# Patient Record
Sex: MALE | Race: WHITE | NOT HISPANIC OR LATINO | Employment: OTHER | URBAN - METROPOLITAN AREA
[De-identification: names, ages, dates, MRNs, and addresses within clinical notes are randomized per-mention and may not be internally consistent; named-entity substitution may affect disease eponyms.]

---

## 2023-09-01 DIAGNOSIS — Z00.00 MEDICARE ANNUAL WELLNESS VISIT, SUBSEQUENT: ICD-10-CM

## 2023-09-01 DIAGNOSIS — R79.9 ABNORMAL BLOOD CHEMISTRY: ICD-10-CM

## 2023-09-01 DIAGNOSIS — E55.9 VITAMIN D DEFICIENCY: ICD-10-CM

## 2023-09-05 ENCOUNTER — LAB (OUTPATIENT)
Dept: LAB | Facility: LAB | Age: 83
End: 2023-09-05
Payer: MEDICARE

## 2023-09-05 DIAGNOSIS — Z00.00 MEDICARE ANNUAL WELLNESS VISIT, SUBSEQUENT: ICD-10-CM

## 2023-09-05 DIAGNOSIS — R79.9 ABNORMAL BLOOD CHEMISTRY: ICD-10-CM

## 2023-09-05 DIAGNOSIS — E55.9 VITAMIN D DEFICIENCY: ICD-10-CM

## 2023-09-05 LAB
ALANINE AMINOTRANSFERASE (SGPT) (U/L) IN SER/PLAS: 15 U/L (ref 10–52)
ANION GAP IN SER/PLAS: 14 MMOL/L (ref 10–20)
APPEARANCE, URINE: CLEAR
BASOPHILS (10*3/UL) IN BLOOD BY AUTOMATED COUNT: 0.04 X10E9/L (ref 0–0.1)
BASOPHILS/100 LEUKOCYTES IN BLOOD BY AUTOMATED COUNT: 1 % (ref 0–2)
BILIRUBIN, URINE: NEGATIVE
BLOOD, URINE: ABNORMAL
CALCIDIOL (25 OH VITAMIN D3) (NG/ML) IN SER/PLAS: 38 NG/ML
CALCIUM (MG/DL) IN SER/PLAS: 9.4 MG/DL (ref 8.6–10.6)
CARBON DIOXIDE, TOTAL (MMOL/L) IN SER/PLAS: 26 MMOL/L (ref 21–32)
CHLORIDE (MMOL/L) IN SER/PLAS: 105 MMOL/L (ref 98–107)
CHOLESTEROL (MG/DL) IN SER/PLAS: 179 MG/DL (ref 0–199)
CHOLESTEROL IN HDL (MG/DL) IN SER/PLAS: 51.1 MG/DL
CHOLESTEROL/HDL RATIO: 3.5
COBALAMIN (VITAMIN B12) (PG/ML) IN SER/PLAS: 333 PG/ML (ref 211–911)
COLOR, URINE: YELLOW
CREATININE (MG/DL) IN SER/PLAS: 1.36 MG/DL (ref 0.5–1.3)
EOSINOPHILS (10*3/UL) IN BLOOD BY AUTOMATED COUNT: 0.19 X10E9/L (ref 0–0.4)
EOSINOPHILS/100 LEUKOCYTES IN BLOOD BY AUTOMATED COUNT: 4.6 % (ref 0–6)
ERYTHROCYTE DISTRIBUTION WIDTH (RATIO) BY AUTOMATED COUNT: 13.2 % (ref 11.5–14.5)
ERYTHROCYTE MEAN CORPUSCULAR HEMOGLOBIN CONCENTRATION (G/DL) BY AUTOMATED: 32.9 G/DL (ref 32–36)
ERYTHROCYTE MEAN CORPUSCULAR VOLUME (FL) BY AUTOMATED COUNT: 97 FL (ref 80–100)
ERYTHROCYTES (10*6/UL) IN BLOOD BY AUTOMATED COUNT: 5.12 X10E12/L (ref 4.5–5.9)
GFR MALE: 51 ML/MIN/1.73M2
GLUCOSE (MG/DL) IN SER/PLAS: 94 MG/DL (ref 74–99)
GLUCOSE, URINE: NEGATIVE MG/DL
HEMATOCRIT (%) IN BLOOD BY AUTOMATED COUNT: 49.5 % (ref 41–52)
HEMOGLOBIN (G/DL) IN BLOOD: 16.3 G/DL (ref 13.5–17.5)
IMMATURE GRANULOCYTES/100 LEUKOCYTES IN BLOOD BY AUTOMATED COUNT: 0.5 % (ref 0–0.9)
KETONES, URINE: NEGATIVE MG/DL
LDL: 108 MG/DL (ref 0–99)
LEUKOCYTE ESTERASE, URINE: NEGATIVE
LEUKOCYTES (10*3/UL) IN BLOOD BY AUTOMATED COUNT: 4.2 X10E9/L (ref 4.4–11.3)
LYMPHOCYTES (10*3/UL) IN BLOOD BY AUTOMATED COUNT: 0.8 X10E9/L (ref 0.8–3)
LYMPHOCYTES/100 LEUKOCYTES IN BLOOD BY AUTOMATED COUNT: 19.2 % (ref 13–44)
MONOCYTES (10*3/UL) IN BLOOD BY AUTOMATED COUNT: 0.55 X10E9/L (ref 0.05–0.8)
MONOCYTES/100 LEUKOCYTES IN BLOOD BY AUTOMATED COUNT: 13.2 % (ref 2–10)
NEUTROPHILS (10*3/UL) IN BLOOD BY AUTOMATED COUNT: 2.57 X10E9/L (ref 1.6–5.5)
NEUTROPHILS/100 LEUKOCYTES IN BLOOD BY AUTOMATED COUNT: 61.5 % (ref 40–80)
NITRITE, URINE: NEGATIVE
NRBC (PER 100 WBCS) BY AUTOMATED COUNT: 0 /100 WBC (ref 0–0)
PH, URINE: 7 (ref 5–8)
PLATELETS (10*3/UL) IN BLOOD AUTOMATED COUNT: 220 X10E9/L (ref 150–450)
POTASSIUM (MMOL/L) IN SER/PLAS: 4.5 MMOL/L (ref 3.5–5.3)
PROSTATE SPECIFIC AG (NG/ML) IN SER/PLAS: 0.78 NG/ML (ref 0–4)
PROTEIN, URINE: NEGATIVE MG/DL
RBC, URINE: 1 /HPF (ref 0–5)
SODIUM (MMOL/L) IN SER/PLAS: 140 MMOL/L (ref 136–145)
SPECIFIC GRAVITY, URINE: 1.01 (ref 1–1.03)
THYROTROPIN (MIU/L) IN SER/PLAS BY DETECTION LIMIT <= 0.05 MIU/L: 1.97 MIU/L (ref 0.44–3.98)
TRIGLYCERIDE (MG/DL) IN SER/PLAS: 98 MG/DL (ref 0–149)
UREA NITROGEN (MG/DL) IN SER/PLAS: 22 MG/DL (ref 6–23)
UROBILINOGEN, URINE: <2 MG/DL (ref 0–1.9)
VLDL: 20 MG/DL (ref 0–40)
WBC, URINE: NORMAL /HPF (ref 0–5)

## 2023-09-05 PROCEDURE — 84460 ALANINE AMINO (ALT) (SGPT): CPT

## 2023-09-05 PROCEDURE — 82306 VITAMIN D 25 HYDROXY: CPT

## 2023-09-05 PROCEDURE — 80048 BASIC METABOLIC PNL TOTAL CA: CPT

## 2023-09-05 PROCEDURE — 85025 COMPLETE CBC W/AUTO DIFF WBC: CPT

## 2023-09-05 PROCEDURE — 82607 VITAMIN B-12: CPT

## 2023-09-05 PROCEDURE — 84153 ASSAY OF PSA TOTAL: CPT

## 2023-09-05 PROCEDURE — 84443 ASSAY THYROID STIM HORMONE: CPT

## 2023-09-05 PROCEDURE — 81001 URINALYSIS AUTO W/SCOPE: CPT

## 2023-09-05 PROCEDURE — 80061 LIPID PANEL: CPT

## 2023-09-05 PROCEDURE — 36415 COLL VENOUS BLD VENIPUNCTURE: CPT

## 2023-09-14 RX ORDER — HYDROCHLOROTHIAZIDE 12.5 MG/1
12.5 CAPSULE ORAL
COMMUNITY

## 2023-09-14 RX ORDER — METOPROLOL SUCCINATE 50 MG/1
TABLET, EXTENDED RELEASE ORAL
COMMUNITY
Start: 2023-05-31

## 2023-09-14 RX ORDER — HYDROCHLOROTHIAZIDE 25 MG/1
TABLET ORAL
COMMUNITY

## 2023-09-15 ENCOUNTER — OFFICE VISIT (OUTPATIENT)
Dept: PRIMARY CARE | Facility: CLINIC | Age: 83
End: 2023-09-15
Payer: MEDICARE

## 2023-09-15 VITALS
HEART RATE: 71 BPM | TEMPERATURE: 97.5 F | RESPIRATION RATE: 18 BRPM | HEIGHT: 72 IN | SYSTOLIC BLOOD PRESSURE: 120 MMHG | WEIGHT: 232 LBS | DIASTOLIC BLOOD PRESSURE: 80 MMHG | BODY MASS INDEX: 31.42 KG/M2 | OXYGEN SATURATION: 96 %

## 2023-09-15 DIAGNOSIS — Z00.00 WELL ADULT EXAM: Primary | ICD-10-CM

## 2023-09-15 PROCEDURE — G0439 PPPS, SUBSEQ VISIT: HCPCS | Performed by: INTERNAL MEDICINE

## 2023-09-15 PROCEDURE — 1170F FXNL STATUS ASSESSED: CPT | Performed by: INTERNAL MEDICINE

## 2023-09-15 PROCEDURE — 1036F TOBACCO NON-USER: CPT | Performed by: INTERNAL MEDICINE

## 2023-09-15 PROCEDURE — 1159F MED LIST DOCD IN RCRD: CPT | Performed by: INTERNAL MEDICINE

## 2023-09-15 ASSESSMENT — PATIENT HEALTH QUESTIONNAIRE - PHQ9
1. LITTLE INTEREST OR PLEASURE IN DOING THINGS: NOT AT ALL
2. FEELING DOWN, DEPRESSED OR HOPELESS: NOT AT ALL
SUM OF ALL RESPONSES TO PHQ9 QUESTIONS 1 AND 2: 0

## 2023-09-15 ASSESSMENT — ACTIVITIES OF DAILY LIVING (ADL)
DRESSING: INDEPENDENT
BATHING: INDEPENDENT
GROCERY_SHOPPING: INDEPENDENT
DOING_HOUSEWORK: INDEPENDENT
TAKING_MEDICATION: INDEPENDENT
MANAGING_FINANCES: INDEPENDENT

## 2023-09-15 ASSESSMENT — ENCOUNTER SYMPTOMS
LOSS OF SENSATION IN FEET: 0
OCCASIONAL FEELINGS OF UNSTEADINESS: 0
DEPRESSION: 0

## 2023-09-15 NOTE — PROGRESS NOTES
Subjective   Patient ID: Jorge Esparza is a 83 y.o. male who presents for Medicare Annual Wellness Visit Subsequent.  Patient comes in for a physical examination, doing well over - all with no particular complaints.   Also in for laboratory review and health maintenance update.  Updating family history as well.          Review of Systems   All other systems reviewed and are negative.      Objective   Physical Exam  Constitutional:       Appearance: Normal appearance.   HENT:      Head: Normocephalic and atraumatic.      Nose: Nose normal.   Cardiovascular:      Rate and Rhythm: Normal rate and regular rhythm.   Abdominal:      General: Abdomen is flat.      Palpations: Abdomen is soft.   Musculoskeletal:         General: Normal range of motion.      Cervical back: Normal range of motion.   Skin:     General: Skin is warm and dry.   Neurological:      Mental Status: He is alert.       /80   Pulse 71   Temp 36.4 °C (97.5 °F)   Resp 18   Ht 1.829 m (6')   Wt 105 kg (232 lb)   SpO2 96%   BMI 31.46 kg/m²         Assessment/Plan   Problem List Items Addressed This Visit    None    ASSESSMENT AND PLAN: Patient on examination is in good health, will do screening blood tests to screen for high cholesterol, diabetes, thyroid. Patient should be taking enough calcium in a balanced diet or supplements to total 1200 mg a day in divided doses unless with history of specific types of kidney stones. Vitamin D 800-1000 iu a day, check levels if not taking any supplements. For Male Patients Only: Prostate cancer screening PSA. Preventive Medicine: colon cancer screening by age 50 if no family history, balanced diet, and exercise as discussed. Seat belt use for injury prevention, living will. Substance use and /or tobacco use counseled when applicable. Alcohol use discussed, use designated . Immunizations TD age 50 and every 10 years. Pneumovax and shingles vaccine counseled. Yearly flu vaccine unless  contraindicated. More than 50% of office visit time spent counseling the patient, questions were answered. If problems arise, patient is to call or come back in. It is understood that the responsibility of healthcare is shared by the patient by following a healthy lifestyle and following the plan above as discussed. Complete physical examination in a year.Patient  knows to call for lab results in two weeks if he does not receive letter or call from our office.

## 2024-02-20 ENCOUNTER — OFFICE VISIT (OUTPATIENT)
Dept: PRIMARY CARE | Facility: CLINIC | Age: 84
End: 2024-02-20
Payer: MEDICARE

## 2024-02-20 VITALS
HEIGHT: 72 IN | RESPIRATION RATE: 18 BRPM | WEIGHT: 231 LBS | BODY MASS INDEX: 31.29 KG/M2 | SYSTOLIC BLOOD PRESSURE: 144 MMHG | DIASTOLIC BLOOD PRESSURE: 100 MMHG | HEART RATE: 85 BPM | TEMPERATURE: 97.8 F | OXYGEN SATURATION: 97 %

## 2024-02-20 DIAGNOSIS — R05.1 ACUTE COUGH: Primary | ICD-10-CM

## 2024-02-20 PROCEDURE — 1036F TOBACCO NON-USER: CPT | Performed by: INTERNAL MEDICINE

## 2024-02-20 PROCEDURE — 99214 OFFICE O/P EST MOD 30 MIN: CPT | Performed by: INTERNAL MEDICINE

## 2024-02-20 RX ORDER — BUDESONIDE AND FORMOTEROL FUMARATE DIHYDRATE 80; 4.5 UG/1; UG/1
2 AEROSOL RESPIRATORY (INHALATION)
Qty: 10.2 G | Refills: 5 | Status: SHIPPED | OUTPATIENT
Start: 2024-02-20 | End: 2025-02-19

## 2024-02-20 RX ORDER — METHYLPREDNISOLONE 4 MG/1
TABLET ORAL
Qty: 21 TABLET | Refills: 0 | Status: SHIPPED | OUTPATIENT
Start: 2024-02-20 | End: 2024-02-27

## 2024-02-20 NOTE — PROGRESS NOTES
Subjective   Patient ID: Jorge Esparza is a 83 y.o. male who presents for GI Problem.  GI Problem        Review of Systems   All other systems reviewed and are negative.      Objective   Physical Exam  Constitutional:       Appearance: Normal appearance.   HENT:      Head: Normocephalic and atraumatic.      Nose: Nose normal.   Cardiovascular:      Rate and Rhythm: Normal rate and regular rhythm.   Abdominal:      General: Abdomen is flat.      Palpations: Abdomen is soft.   Musculoskeletal:         General: Normal range of motion.      Cervical back: Normal range of motion.   Skin:     General: Skin is warm and dry.   Neurological:      Mental Status: He is alert.       BP (!) 144/100   Pulse 85   Temp 36.6 °C (97.8 °F)   Resp 18   Ht 1.829 m (6')   Wt 105 kg (231 lb)   SpO2 97%   BMI 31.33 kg/m²         Assessment/Plan   Problem List Items Addressed This Visit       Acute cough - Primary    Relevant Medications    budesonide-formoteroL (Symbicort) 80-4.5 mcg/actuation inhaler    methylPREDNISolone (Medrol Dospak) 4 mg tablets

## 2024-04-15 ENCOUNTER — TELEPHONE (OUTPATIENT)
Dept: PRIMARY CARE | Facility: CLINIC | Age: 84
End: 2024-04-15
Payer: MEDICARE

## 2024-04-15 DIAGNOSIS — J45.909 MILD ASTHMA, UNSPECIFIED WHETHER COMPLICATED, UNSPECIFIED WHETHER PERSISTENT (HHS-HCC): Primary | ICD-10-CM

## 2024-04-15 RX ORDER — METHYLPREDNISOLONE 4 MG/1
8 TABLET ORAL ONCE
Status: CANCELLED | OUTPATIENT
Start: 2024-04-16 | End: 2024-04-16

## 2024-04-15 NOTE — TELEPHONE ENCOUNTER
Pt completed Rx from last month and it worked beautifully.  Slowly since then, Sx coming back.  Now when Pt is horizontal, he wheezes.  When vertical, no issues at all.       Pt req refill of the previous Rx to  [facility] on [phone] pt cell phone

## 2024-04-29 ENCOUNTER — OFFICE VISIT (OUTPATIENT)
Dept: PRIMARY CARE | Facility: CLINIC | Age: 84
End: 2024-04-29
Payer: MEDICARE

## 2024-04-29 VITALS
BODY MASS INDEX: 29.35 KG/M2 | DIASTOLIC BLOOD PRESSURE: 78 MMHG | WEIGHT: 228.7 LBS | RESPIRATION RATE: 15 BRPM | HEIGHT: 74 IN | SYSTOLIC BLOOD PRESSURE: 122 MMHG | TEMPERATURE: 98.4 F | HEART RATE: 56 BPM | OXYGEN SATURATION: 94 %

## 2024-04-29 DIAGNOSIS — R05.3 CHRONIC COUGH: Primary | ICD-10-CM

## 2024-04-29 PROCEDURE — 1125F AMNT PAIN NOTED PAIN PRSNT: CPT | Performed by: INTERNAL MEDICINE

## 2024-04-29 PROCEDURE — 1036F TOBACCO NON-USER: CPT | Performed by: INTERNAL MEDICINE

## 2024-04-29 PROCEDURE — 99213 OFFICE O/P EST LOW 20 MIN: CPT | Performed by: INTERNAL MEDICINE

## 2024-04-29 PROCEDURE — 1159F MED LIST DOCD IN RCRD: CPT | Performed by: INTERNAL MEDICINE

## 2024-04-29 RX ORDER — METHYLPREDNISOLONE 4 MG/1
TABLET ORAL
COMMUNITY
Start: 2024-04-16

## 2024-04-29 RX ORDER — METHYLPREDNISOLONE 4 MG/1
TABLET ORAL
Qty: 21 TABLET | Refills: 0 | Status: SHIPPED | OUTPATIENT
Start: 2024-04-29 | End: 2024-05-06

## 2024-04-29 RX ORDER — BUDESONIDE AND FORMOTEROL FUMARATE DIHYDRATE 80; 4.5 UG/1; UG/1
2 AEROSOL RESPIRATORY (INHALATION)
Qty: 10.2 G | Refills: 5 | Status: SHIPPED | OUTPATIENT
Start: 2024-04-29 | End: 2025-04-29

## 2024-04-29 ASSESSMENT — PAIN SCALES - GENERAL: PAINLEVEL: 2

## 2024-04-29 ASSESSMENT — ENCOUNTER SYMPTOMS: COUGH: 1

## 2024-04-29 NOTE — PROGRESS NOTES
Patient is here to discuss a persistent cough , and congestion that has lingered for about 2 months   No Pain  No Rx RF's Needed

## 2024-04-29 NOTE — PROGRESS NOTES
"Subjective   Patient ID: Jorge Esparza is a 84 y.o. male who presents for Cough.  Cough    In for on and off cought,     Review of Systems   Respiratory:  Positive for cough.        Objective   Physical Exam  /78   Pulse 56   Temp 36.9 °C (98.4 °F)   Resp 15   Ht 1.867 m (6' 1.5\")   Wt 104 kg (228 lb 11.2 oz)   SpO2 94%   BMI 29.76 kg/m²         Assessment/Plan   Problem List Items Addressed This Visit       Chronic cough - Primary    Relevant Medications    methylPREDNISolone (Medrol Dospak) 4 mg tablets    budesonide-formoteroL (Symbicort) 80-4.5 mcg/actuation inhaler    Other Relevant Orders    XR chest 2 views          "

## 2024-04-30 ENCOUNTER — HOSPITAL ENCOUNTER (OUTPATIENT)
Dept: RADIOLOGY | Facility: CLINIC | Age: 84
Discharge: HOME | End: 2024-04-30
Payer: MEDICARE

## 2024-04-30 DIAGNOSIS — R05.3 CHRONIC COUGH: ICD-10-CM

## 2024-04-30 PROCEDURE — 71046 X-RAY EXAM CHEST 2 VIEWS: CPT | Performed by: RADIOLOGY

## 2024-04-30 PROCEDURE — 71046 X-RAY EXAM CHEST 2 VIEWS: CPT

## 2024-06-14 ENCOUNTER — APPOINTMENT (OUTPATIENT)
Dept: PRIMARY CARE | Facility: CLINIC | Age: 84
End: 2024-06-14
Payer: MEDICARE

## 2024-06-14 VITALS
WEIGHT: 228.4 LBS | HEIGHT: 72 IN | TEMPERATURE: 98 F | OXYGEN SATURATION: 96 % | SYSTOLIC BLOOD PRESSURE: 126 MMHG | DIASTOLIC BLOOD PRESSURE: 74 MMHG | HEART RATE: 91 BPM | BODY MASS INDEX: 30.94 KG/M2 | RESPIRATION RATE: 17 BRPM

## 2024-06-14 DIAGNOSIS — J45.909 MILD REACTIVE AIRWAYS DISEASE, UNSPECIFIED WHETHER PERSISTENT (HHS-HCC): Primary | ICD-10-CM

## 2024-06-14 PROCEDURE — 1159F MED LIST DOCD IN RCRD: CPT | Performed by: INTERNAL MEDICINE

## 2024-06-14 PROCEDURE — 99214 OFFICE O/P EST MOD 30 MIN: CPT | Performed by: INTERNAL MEDICINE

## 2024-06-14 PROCEDURE — 1126F AMNT PAIN NOTED NONE PRSNT: CPT | Performed by: INTERNAL MEDICINE

## 2024-06-14 PROCEDURE — 1036F TOBACCO NON-USER: CPT | Performed by: INTERNAL MEDICINE

## 2024-06-14 ASSESSMENT — ENCOUNTER SYMPTOMS: WHEEZING: 1

## 2024-06-14 ASSESSMENT — PAIN SCALES - GENERAL: PAINLEVEL: 0-NO PAIN

## 2024-06-14 NOTE — PROGRESS NOTES
Patient is here to discuss a progressive rattling cough , accompanied with wheezing . Already had a chest Xray , and lungs were clear. Patient notices that when he lays down versus sitting straight up it the wheezing goes away   No Pain overall   No Rx RF's needed

## 2024-06-14 NOTE — PROGRESS NOTES
Subjective   Patient ID: Jorge Esparza is a 84 y.o. male who presents for Wheezing and Throat Pain.  In for follow up for reactive airway disease. Has no been improving.,     Wheezing         Review of Systems   Respiratory:  Positive for wheezing.        Objective   Physical Exam  /74   Pulse 91   Temp 36.7 °C (98 °F)   Resp 17   Ht 1.829 m (6')   Wt 104 kg (228 lb 6.4 oz)   SpO2 96%   BMI 30.98 kg/m²         Assessment/Plan   Problem List Items Addressed This Visit    None  ASSESSMENT AND PLAN: Patient on examination is in fair health except for medical conditions discussed above, obtained screening blood tests to screen for high cholesterol, diabetes, thyroid, Ekg if above 50 years old or earlier if with cardiac history. Patient should be taking enough calcium in a balanced diet  Weight control especially if BMI is above ideal range. For Female Patients Only:  Mammogram yearly and PAP test with gynecology unless s/p Total hysterectomy  Bone density test at age 60 and above and every two years after that. Preventive Medicine: colon cancer screening by age 45 if no family history as well PSA @ 50 , balanced diet, and exercise as discussed. Seat belt use for injury prevention, living will. Substance use and /or tobacco use counseled when applicable. Alcohol use discussed, use designated . Immunizations TD age 50 and every 10 years. Pneumovax and shingles vaccine counseled. Yearly flu vaccine unless contraindicated. More than 50% of office visit time spent counseling the patient, questions were answered. If problems arise, patient is to call or come back in. It is understood that the responsibility of healthcare is shared by the patient by following a healthy lifestyle and following the plan above as discussed. Advised complete physical examination every year. Pending additional results, may need to come for a return office visit for follow-up.

## 2024-07-24 DIAGNOSIS — R05.1 ACUTE COUGH: ICD-10-CM

## 2024-07-24 RX ORDER — BUDESONIDE AND FORMOTEROL FUMARATE DIHYDRATE 80; 4.5 UG/1; UG/1
2 AEROSOL RESPIRATORY (INHALATION)
Qty: 10.2 G | Refills: 5 | Status: SHIPPED | OUTPATIENT
Start: 2024-07-24 | End: 2025-07-24

## 2024-08-05 DIAGNOSIS — E55.9 VITAMIN D DEFICIENCY: ICD-10-CM

## 2024-08-05 DIAGNOSIS — Z00.00 MEDICARE ANNUAL WELLNESS VISIT, SUBSEQUENT: Primary | ICD-10-CM

## 2024-08-05 DIAGNOSIS — R79.9 ABNORMAL FINDING OF BLOOD CHEMISTRY, UNSPECIFIED: ICD-10-CM

## 2024-08-27 ENCOUNTER — LAB (OUTPATIENT)
Dept: LAB | Facility: LAB | Age: 84
End: 2024-08-27
Payer: MEDICARE

## 2024-08-27 DIAGNOSIS — E55.9 VITAMIN D DEFICIENCY: ICD-10-CM

## 2024-08-27 DIAGNOSIS — R79.9 ABNORMAL FINDING OF BLOOD CHEMISTRY, UNSPECIFIED: ICD-10-CM

## 2024-08-27 DIAGNOSIS — Z00.00 MEDICARE ANNUAL WELLNESS VISIT, SUBSEQUENT: ICD-10-CM

## 2024-08-27 LAB
25(OH)D3 SERPL-MCNC: 43 NG/ML (ref 30–100)
ALT SERPL W P-5'-P-CCNC: 18 U/L (ref 10–52)
ANION GAP SERPL CALC-SCNC: 15 MMOL/L (ref 10–20)
APPEARANCE UR: CLEAR
BACTERIA #/AREA URNS AUTO: ABNORMAL /HPF
BILIRUB UR STRIP.AUTO-MCNC: NEGATIVE MG/DL
BUN SERPL-MCNC: 25 MG/DL (ref 6–23)
CALCIUM SERPL-MCNC: 9 MG/DL (ref 8.6–10.6)
CHLORIDE SERPL-SCNC: 106 MMOL/L (ref 98–107)
CHOLEST SERPL-MCNC: 190 MG/DL (ref 0–199)
CHOLESTEROL/HDL RATIO: 3.3
CO2 SERPL-SCNC: 23 MMOL/L (ref 21–32)
COLOR UR: ABNORMAL
CREAT SERPL-MCNC: 1.39 MG/DL (ref 0.5–1.3)
EGFRCR SERPLBLD CKD-EPI 2021: 50 ML/MIN/1.73M*2
ERYTHROCYTE [DISTWIDTH] IN BLOOD BY AUTOMATED COUNT: 13 % (ref 11.5–14.5)
EST. AVERAGE GLUCOSE BLD GHB EST-MCNC: 108 MG/DL
GLUCOSE SERPL-MCNC: 102 MG/DL (ref 74–99)
GLUCOSE UR STRIP.AUTO-MCNC: NORMAL MG/DL
HBA1C MFR BLD: 5.4 %
HCT VFR BLD AUTO: 48 % (ref 41–52)
HDLC SERPL-MCNC: 58.4 MG/DL
HGB BLD-MCNC: 15.7 G/DL (ref 13.5–17.5)
HOLD SPECIMEN: NORMAL
KETONES UR STRIP.AUTO-MCNC: NEGATIVE MG/DL
LDLC SERPL CALC-MCNC: 118 MG/DL
LEUKOCYTE ESTERASE UR QL STRIP.AUTO: NEGATIVE
MCH RBC QN AUTO: 31.1 PG (ref 26–34)
MCHC RBC AUTO-ENTMCNC: 32.7 G/DL (ref 32–36)
MCV RBC AUTO: 95 FL (ref 80–100)
MUCOUS THREADS #/AREA URNS AUTO: ABNORMAL /LPF
NITRITE UR QL STRIP.AUTO: NEGATIVE
NON HDL CHOLESTEROL: 132 MG/DL (ref 0–149)
NRBC BLD-RTO: 0 /100 WBCS (ref 0–0)
PH UR STRIP.AUTO: 6.5 [PH]
PLATELET # BLD AUTO: 200 X10*3/UL (ref 150–450)
POTASSIUM SERPL-SCNC: 4.6 MMOL/L (ref 3.5–5.3)
PROT UR STRIP.AUTO-MCNC: NEGATIVE MG/DL
PSA SERPL-MCNC: 0.84 NG/ML
RBC # BLD AUTO: 5.05 X10*6/UL (ref 4.5–5.9)
RBC # UR STRIP.AUTO: ABNORMAL /UL
RBC #/AREA URNS AUTO: ABNORMAL /HPF
SODIUM SERPL-SCNC: 139 MMOL/L (ref 136–145)
SP GR UR STRIP.AUTO: 1.01
TRIGL SERPL-MCNC: 66 MG/DL (ref 0–149)
TSH SERPL-ACNC: 2.84 MIU/L (ref 0.44–3.98)
UROBILINOGEN UR STRIP.AUTO-MCNC: NORMAL MG/DL
VIT B12 SERPL-MCNC: 350 PG/ML (ref 211–911)
VLDL: 13 MG/DL (ref 0–40)
WBC # BLD AUTO: 4.9 X10*3/UL (ref 4.4–11.3)
WBC #/AREA URNS AUTO: ABNORMAL /HPF

## 2024-08-27 PROCEDURE — 82607 VITAMIN B-12: CPT

## 2024-08-27 PROCEDURE — 82306 VITAMIN D 25 HYDROXY: CPT

## 2024-08-27 PROCEDURE — 84460 ALANINE AMINO (ALT) (SGPT): CPT

## 2024-08-27 PROCEDURE — 36415 COLL VENOUS BLD VENIPUNCTURE: CPT

## 2024-08-27 PROCEDURE — 84153 ASSAY OF PSA TOTAL: CPT

## 2024-08-27 PROCEDURE — 80061 LIPID PANEL: CPT

## 2024-08-27 PROCEDURE — 80048 BASIC METABOLIC PNL TOTAL CA: CPT

## 2024-08-27 PROCEDURE — 83036 HEMOGLOBIN GLYCOSYLATED A1C: CPT

## 2024-08-27 PROCEDURE — 84443 ASSAY THYROID STIM HORMONE: CPT

## 2024-08-27 PROCEDURE — 85027 COMPLETE CBC AUTOMATED: CPT

## 2024-08-27 PROCEDURE — 81001 URINALYSIS AUTO W/SCOPE: CPT

## 2024-09-17 ENCOUNTER — APPOINTMENT (OUTPATIENT)
Dept: PRIMARY CARE | Facility: CLINIC | Age: 84
End: 2024-09-17
Payer: MEDICARE

## 2024-09-17 VITALS
BODY MASS INDEX: 30.88 KG/M2 | HEART RATE: 90 BPM | WEIGHT: 228 LBS | OXYGEN SATURATION: 98 % | SYSTOLIC BLOOD PRESSURE: 132 MMHG | DIASTOLIC BLOOD PRESSURE: 80 MMHG | TEMPERATURE: 98 F | HEIGHT: 72 IN | RESPIRATION RATE: 18 BRPM

## 2024-09-17 DIAGNOSIS — J45.909 REACTIVE AIRWAY DISEASE WITHOUT COMPLICATION, UNSPECIFIED ASTHMA SEVERITY, UNSPECIFIED WHETHER PERSISTENT (HHS-HCC): ICD-10-CM

## 2024-09-17 DIAGNOSIS — Z00.00 WELL ADULT EXAM: Primary | ICD-10-CM

## 2024-09-17 PROCEDURE — 1036F TOBACCO NON-USER: CPT | Performed by: INTERNAL MEDICINE

## 2024-09-17 PROCEDURE — 99397 PER PM REEVAL EST PAT 65+ YR: CPT | Performed by: INTERNAL MEDICINE

## 2024-09-17 PROCEDURE — 1170F FXNL STATUS ASSESSED: CPT | Performed by: INTERNAL MEDICINE

## 2024-09-17 PROCEDURE — 1158F ADVNC CARE PLAN TLK DOCD: CPT | Performed by: INTERNAL MEDICINE

## 2024-09-17 PROCEDURE — 1123F ACP DISCUSS/DSCN MKR DOCD: CPT | Performed by: INTERNAL MEDICINE

## 2024-09-17 ASSESSMENT — PATIENT HEALTH QUESTIONNAIRE - PHQ9
SUM OF ALL RESPONSES TO PHQ9 QUESTIONS 1 AND 2: 0
5. POOR APPETITE OR OVEREATING: NOT AT ALL
2. FEELING DOWN, DEPRESSED OR HOPELESS: NOT AT ALL
1. LITTLE INTEREST OR PLEASURE IN DOING THINGS: NOT AT ALL
8. MOVING OR SPEAKING SO SLOWLY THAT OTHER PEOPLE COULD HAVE NOTICED. OR THE OPPOSITE, BEING SO FIGETY OR RESTLESS THAT YOU HAVE BEEN MOVING AROUND A LOT MORE THAN USUAL: NOT AT ALL
4. FEELING TIRED OR HAVING LITTLE ENERGY: NOT AT ALL
9. THOUGHTS THAT YOU WOULD BE BETTER OFF DEAD, OR OF HURTING YOURSELF: NOT AT ALL
6. FEELING BAD ABOUT YOURSELF - OR THAT YOU ARE A FAILURE OR HAVE LET YOURSELF OR YOUR FAMILY DOWN: NOT AT ALL
3. TROUBLE FALLING OR STAYING ASLEEP OR SLEEPING TOO MUCH: NOT AT ALL
7. TROUBLE CONCENTRATING ON THINGS, SUCH AS READING THE NEWSPAPER OR WATCHING TELEVISION: NOT AT ALL
SUM OF ALL RESPONSES TO PHQ QUESTIONS 1-9: 0
10. IF YOU CHECKED OFF ANY PROBLEMS, HOW DIFFICULT HAVE THESE PROBLEMS MADE IT FOR YOU TO DO YOUR WORK, TAKE CARE OF THINGS AT HOME, OR GET ALONG WITH OTHER PEOPLE: NOT DIFFICULT AT ALL

## 2024-09-17 ASSESSMENT — ACTIVITIES OF DAILY LIVING (ADL)
DOING_HOUSEWORK: INDEPENDENT
BATHING: INDEPENDENT
MANAGING_FINANCES: INDEPENDENT
DRESSING: INDEPENDENT
TAKING_MEDICATION: INDEPENDENT
GROCERY_SHOPPING: INDEPENDENT

## 2024-09-17 ASSESSMENT — ENCOUNTER SYMPTOMS
DEPRESSION: 0
OCCASIONAL FEELINGS OF UNSTEADINESS: 0
LOSS OF SENSATION IN FEET: 0

## 2024-09-17 NOTE — PROGRESS NOTES
Subjective   Patient ID: Jorge Esparza is a 84 y.o. male who presents for Medicare Annual Wellness Visit Subsequent and throat issue.    Patient comes in for a physical examination, doing well over - all with no particular complaints.   Also in for laboratory review and health maintenance update.  Updating family history as well.  Ismael is in for follow-up for a yearly physical.  As well as continuing wheezing at night.  Most of his wheezing is expiratory.  He also has no issues with shortness of breath. He is otherwise well.          Review of Systems   All other systems reviewed and are negative.      Previous history  Past Medical History:   Diagnosis Date    Essential (primary) hypertension 06/14/2013    Benign essential hypertension     No past surgical history on file.  Social History     Tobacco Use    Smoking status: Former     Types: Cigarettes, Pipe    Smokeless tobacco: Never   Substance Use Topics    Alcohol use: Yes     Alcohol/week: 2.0 standard drinks of alcohol     Types: 2 Cans of beer per week    Drug use: Never     No family history on file.  No Known Allergies  Current Outpatient Medications   Medication Instructions    budesonide-formoteroL (Symbicort) 80-4.5 mcg/actuation inhaler 2 puffs, inhalation, 2 times daily RT, Rinse mouth with water after use to reduce aftertaste and incidence of candidiasis. Do not swallow.    budesonide-formoteroL (Symbicort) 80-4.5 mcg/actuation inhaler 2 puffs, inhalation, 2 times daily RT, Rinse mouth with water after use to reduce aftertaste and incidence of candidiasis. Do not swallow.    hydroCHLOROthiazide (HYDRODiuril) 25 mg tablet oral    hydroCHLOROthiazide (MICROZIDE) 12.5 mg, oral    methylPREDNISolone (Medrol Dospak) 4 mg tablets TAKE BY MOUTH AS DIRECTED ON PACKAGE    metoprolol succinate XL (Toprol-XL) 50 mg 24 hr tablet     rivaroxaban (Xarelto) 15 mg tablet oral       Objective       Physical Exam      Assessment/Plan   Jorge Esparza is a 84 y.o.  male who presents for the concerns below:    Problem List Items Addressed This Visit    None           Discussed with:   Return in :    Portions of this note were generated using digital voice recognition software, and may contain grammatical errors       James Ngo MD  09/17/24  9:44 AM     No

## 2024-09-23 NOTE — PROGRESS NOTES
" Patient: Jorge Esparza    [id number]  : 1940 -- AGE 84 y.o.    Provider: CECI Mott     Location [facility]   Service Date: 2024       Department of Medicine  Division of Pulmonary, Critical Care, and Sleep Medicine       [facility]  New Visit Note    HISTORY OF PRESENT ILLNESS     The patient's referring provider is: James Ngo MD    PCP: Dr. James Ngo  Cardiology: Dr. Hurt    HISTORY OF PRESENT ILLNESS   Jorge Esparza is a 84 y.o. male who presents to a [facility] for an evaluation with concerns of wheezing.  I have independently interviewed and examined the patient in the office and reviewed available records.    Current History  Patient presents to pulmonary clinic with referral by primary care for concerns of wheezing.  There is no PFT on record.  He has a cardiac history consisting of atrial fibrillation; currently on Xarelto.    On today's visit, the patient reports he has noticed when he lays down he will hear wheezing. Has been ongoing since around 2024. Was given Symbicort 80 and medrol dose packs for the symptoms. Symptoms only occur when he is laying flat. When breathing out, can hear a slight noise. Describes it as \"musical\". Depending on what side he lays on, can occur at random. Intermittent orthopnea. Has been given 2 medrol dose packs. While on this, symptoms will temporarily resolve but symptoms will return within about 1 week. With his Symbicort 80 inhaler; states he would use 2 puffs BID. Then dropped it down to 2 puffs once a day. Symptoms are intermittent and can fluctuate. Reports a lot of throat clearing and having seasonal allergy issues. Dealing with sinus congestion now also. No other inhaler therapy in the past. He reports hx of afib; sees Dr. Hurt from cardiology. Hx of cardioversions years back. Denies CP, palps. Denies SOB at rest. No notable " CASILLAS; only if he is really pushing himself. No notable wheezing at these times. Denies chronic cough. Some cough in the AM and at night when laying down due to throat clearing. Tried Flonase in the past. Denies GERD.     Denies premature birth. No childhood pulmonary issues growing up. No pulmonary hospitalizations. Has never been on home oxygen therapy before.    Has never completed a sleep study before. No notable snoring now. Not waking up choking. No AM headaches. Some daytime fatigue.    CAT Today: 14  ACT Today: 16  ESS Today:     REVIEW OF SYSTEMS     REVIEW OF SYSTEMS  Review of Systems   Constitutional:  Negative for activity change, appetite change, chills, fatigue, fever and unexpected weight change.   HENT:  Positive for congestion and postnasal drip. Negative for rhinorrhea, sinus pressure, sinus pain, sneezing, sore throat, trouble swallowing and voice change.         + throat clearing   Eyes:  Negative for redness and itching.   Respiratory:  Positive for shortness of breath and wheezing. Negative for cough, chest tightness and stridor.    Cardiovascular:  Negative for chest pain, palpitations and leg swelling.        +orthopnea   Gastrointestinal:  Negative for abdominal pain, diarrhea, nausea and vomiting.        Denies acid reflux   Musculoskeletal:  Negative for arthralgias, back pain, joint swelling and myalgias.   Skin:  Negative for rash.   Allergic/Immunologic: Negative for immunocompromised state.   Neurological:  Negative for dizziness, tremors, weakness and headaches.   Hematological:  Does not bruise/bleed easily.   Psychiatric/Behavioral:  Negative for agitation and sleep disturbance. The patient is not nervous/anxious.         Denies depression   All other systems reviewed and are negative.      ALLERGIES AND MEDICATIONS     ALLERGIES  No Known Allergies    MEDICATIONS  Current Outpatient Medications   Medication Sig Dispense Refill    metoprolol succinate XL (Toprol-XL) 50 mg 24 hr  tablet Take 1 tablet (50 mg) by mouth 2 times a day.      rivaroxaban (Xarelto) 15 mg tablet Take 1 tablet (15 mg) by mouth once daily.      albuterol 90 mcg/actuation inhaler Inhale 2 puffs every 4 hours if needed for wheezing or shortness of breath (You may also use this 10-15 minutes prior to exertional activity as needed). 18 g 5    budesonide-formoteroL (Symbicort) 160-4.5 mcg/actuation inhaler Inhale 2 puffs 2 times a day. Rinse mouth with water after use to reduce aftertaste and incidence of candidiasis. Do not swallow. 10.2 g 3    cetirizine (ZyrTEC) 10 mg tablet Take 1 tablet (10 mg) by mouth once daily. Take in the evening before bedtime 30 tablet 3     No current facility-administered medications for this visit.       PAST HISTORY     PAST MEDICAL HISTORY  He  has a past medical history of Essential (primary) hypertension (06/14/2013).    PAST SURGICAL HISTORY  No past surgical history on file.    IMMUNIZATION HISTORY  Immunization History   Administered Date(s) Administered    Pfizer Purple Cap SARS-CoV-2 01/27/2021, 02/24/2021, 01/05/2022       SOCIAL HISTORY  He  reports that he quit smoking about 50 years ago. His smoking use included cigarettes, pipe, and cigars. He started smoking about 66 years ago. He has a 12.7 pack-year smoking history. He has never been exposed to tobacco smoke. He has never used smokeless tobacco. He reports current alcohol use of about 2.0 standard drinks of alcohol per week. He reports that he does not currently use drugs.     OCCUPATIONAL/ENVIRONMENTAL HISTORY  Previously worked as: , [employer]; no notable exposures  Currently works as: retired  DOES/DOES NOT: does not have known exposure to asbestos, silica, beryllium or inhaled metals.  DOES/DOES NOT: does not have exposure to birds or exotic animals.  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      PHYSICAL EXAM     VITAL SIGNS: BP (!) 155/97   Pulse 84   Ht 1.854  "m (6' 1\")   Wt 102 kg (225 lb)   SpO2 96%   BMI 29.69 kg/m²      PREVIOUS WEIGHTS:  Wt Readings from Last 3 Encounters:   09/24/24 102 kg (225 lb)   09/17/24 103 kg (228 lb)   06/14/24 104 kg (228 lb 6.4 oz)       Physical Exam  Vitals reviewed.   Constitutional:       General: He is not in acute distress.     Appearance: Normal appearance. He is not ill-appearing or toxic-appearing.   HENT:      Head: Normocephalic.      Nose: No rhinorrhea.   Cardiovascular:      Rate and Rhythm: Normal rate. Rhythm irregular.      Heart sounds: Normal heart sounds.   Pulmonary:      Effort: Pulmonary effort is normal. No respiratory distress.      Breath sounds: No stridor. Wheezing present. No rhonchi or rales.      Comments: Faint, mild expiratory wheeze RUL which cleared on re-exam  Abdominal:      General: Abdomen is flat.   Musculoskeletal:         General: Normal range of motion.      Right lower leg: No edema.      Left lower leg: No edema.   Skin:     General: Skin is warm and dry.      Nails: There is no clubbing.   Neurological:      General: No focal deficit present.      Mental Status: He is alert and oriented to person, place, and time.   Psychiatric:         Mood and Affect: Mood normal.         Behavior: Behavior normal.         Judgment: Judgment normal.         RESULTS/DATA     Pulmonary Function Test Results     No PFT on record    Chest Radiograph   CXR  4/30/24: IMPRESSION: No evidence of acute intrathoracic abnormality.    Chest CT Scan     No results found for this or any previous visit from the past 365 days.      Echocardiogram & Cardiac Studies     No results found for this or any previous visit from the past 365 days.       Labwork & Pathology   Complete Blood Count  Lab Results   Component Value Date    WBC 4.9 08/27/2024    HGB 15.7 08/27/2024    HCT 48.0 08/27/2024    MCV 95 08/27/2024     08/27/2024       Peripheral Eosinophil Count:   Eosinophils Absolute (x10E9/L)   Date Value " "  09/05/2023 0.19   06/28/2022 0.23   01/11/2021 0.13       Immunocap IgE  No results found for: \"ICIGE\"    Bronchoscopy & Pathology/Cultures       ASSESSMENT/PLAN     Mr. Esparza is a 84 y.o. male; was referred to the [facility] for evaluation of wheezing.    Problem List and Orders  Diagnoses and all orders for this visit:  Moderate persistent asthma without complication ([facility])  -     Referral to Pulmonology  -     budesonide-formoteroL (Symbicort) 160-4.5 mcg/actuation inhaler; Inhale 2 puffs 2 times a day. Rinse mouth with water after use to reduce aftertaste and incidence of candidiasis. Do not swallow.  -     albuterol 90 mcg/actuation inhaler; Inhale 2 puffs every 4 hours if needed for wheezing or shortness of breath (You may also use this 10-15 minutes prior to exertional activity as needed).  -     Complete Pulmonary Function Test Pre/Post Bronchodialator (Spirometry Pre/Post/DLCO/Lung Volumes); Future  -     Exhaled Nitric Oxide (FeNO); Future  Post-nasal drainage  -     cetirizine (ZyrTEC) 10 mg tablet; Take 1 tablet (10 mg) by mouth once daily. Take in the evening before bedtime  Atrial fibrillation, unspecified type (Multi)      Assessment and Plan / Recommendations:  Asthma: Has never formally been diagnosed with asthma and has never completed PFT testing.  He reports that he does not have any notable shortness of breath at rest or CASILLAS; unless really pushing/exerting himself.  When he goes to lay down horizontal at night he does experience orthopnea and will also hear wheezing when this occurs.  Has been going on since around February 2024.  At this time he was given Symbicort 80 by his PCP and has also received 2 Medrol Dosepaks since this timeframe.  States that the symptoms will completely resolve while on the Medrol pack and will usually return within 1 week of finishing the medicine.  He will take the Symbicort 80 sometimes twice a day; other times once a " day.  Does not feel like the inhaler fully takes care of the symptoms.  -Ordering full PFT and FeNO to establish lung function baseline  -Stop Symbicort 80  -Start Symbicort 160; 2 puffs twice daily.  Rinse mouth after use  -Start albuterol HFA as needed  -Symptoms and history sound consistent with underlying asthma/reactive airway disease; will clarify with upcoming PFT testing and see how he symptomatically responds to increased therapy    2.  PND: Does report frequent throat clearing; usually worse when laying down.  Also experiences some increased sinus congestion.  -Start cetirizine 10 mg daily in the evening  -Previously used Flonase as needed which she found helpful at times    3.  A-fib: Was diagnosed a number of years ago and underwent 2 separate cardioversions; both of which failed.  Currently on Xarelto.  -Continue following with Dr. Hurt from cardiology for management    RTC 2-3 months    Joe Burnett, CNP  [facility]    *This note was dictated using DRAGON speech recognition software and was corrected for spelling or grammatical errors, but despite proofreading several typographical errors might be present that might affect the meaning of the content.*

## 2024-09-24 ENCOUNTER — OFFICE VISIT (OUTPATIENT)
Dept: PULMONOLOGY | Facility: CLINIC | Age: 84
End: 2024-09-24
Payer: MEDICARE

## 2024-09-24 VITALS
DIASTOLIC BLOOD PRESSURE: 97 MMHG | HEIGHT: 73 IN | SYSTOLIC BLOOD PRESSURE: 155 MMHG | OXYGEN SATURATION: 96 % | BODY MASS INDEX: 29.82 KG/M2 | WEIGHT: 225 LBS | HEART RATE: 84 BPM

## 2024-09-24 DIAGNOSIS — R09.82 POST-NASAL DRAINAGE: ICD-10-CM

## 2024-09-24 DIAGNOSIS — I48.91 ATRIAL FIBRILLATION, UNSPECIFIED TYPE (MULTI): ICD-10-CM

## 2024-09-24 DIAGNOSIS — J45.40 MODERATE PERSISTENT ASTHMA WITHOUT COMPLICATION (HHS-HCC): Primary | ICD-10-CM

## 2024-09-24 PROCEDURE — 1160F RVW MEDS BY RX/DR IN RCRD: CPT | Performed by: NURSE PRACTITIONER

## 2024-09-24 PROCEDURE — 99214 OFFICE O/P EST MOD 30 MIN: CPT | Performed by: NURSE PRACTITIONER

## 2024-09-24 PROCEDURE — 99204 OFFICE O/P NEW MOD 45 MIN: CPT | Performed by: NURSE PRACTITIONER

## 2024-09-24 PROCEDURE — 1123F ACP DISCUSS/DSCN MKR DOCD: CPT | Performed by: NURSE PRACTITIONER

## 2024-09-24 PROCEDURE — 1159F MED LIST DOCD IN RCRD: CPT | Performed by: NURSE PRACTITIONER

## 2024-09-24 PROCEDURE — 1126F AMNT PAIN NOTED NONE PRSNT: CPT | Performed by: NURSE PRACTITIONER

## 2024-09-24 PROCEDURE — 1036F TOBACCO NON-USER: CPT | Performed by: NURSE PRACTITIONER

## 2024-09-24 RX ORDER — ALBUTEROL SULFATE 90 UG/1
2 INHALANT RESPIRATORY (INHALATION) EVERY 4 HOURS PRN
Qty: 18 G | Refills: 5 | Status: SHIPPED | OUTPATIENT
Start: 2024-09-24

## 2024-09-24 RX ORDER — CETIRIZINE HYDROCHLORIDE 10 MG/1
10 TABLET ORAL DAILY
Qty: 30 TABLET | Refills: 3 | Status: SHIPPED | OUTPATIENT
Start: 2024-09-24

## 2024-09-24 RX ORDER — BUDESONIDE AND FORMOTEROL FUMARATE DIHYDRATE 160; 4.5 UG/1; UG/1
2 AEROSOL RESPIRATORY (INHALATION)
Qty: 10.2 G | Refills: 3 | Status: SHIPPED | OUTPATIENT
Start: 2024-09-24

## 2024-09-24 ASSESSMENT — ENCOUNTER SYMPTOMS
COUGH: 0
TROUBLE SWALLOWING: 0
CHEST TIGHTNESS: 0
SINUS PRESSURE: 0
BACK PAIN: 0
ROS GI COMMENTS: DENIES ACID REFLUX
BRUISES/BLEEDS EASILY: 0
STRIDOR: 0
FEVER: 0
MYALGIAS: 0
DIARRHEA: 0
NERVOUS/ANXIOUS: 0
VOICE CHANGE: 0
WHEEZING: 1
APPETITE CHANGE: 0
EYE REDNESS: 0
EYE ITCHING: 0
ARTHRALGIAS: 0
UNEXPECTED WEIGHT CHANGE: 0
DIZZINESS: 0
NAUSEA: 0
WEAKNESS: 0
AGITATION: 0
JOINT SWELLING: 0
CHILLS: 0
SLEEP DISTURBANCE: 0
ABDOMINAL PAIN: 0
RHINORRHEA: 0
ACTIVITY CHANGE: 0
SORE THROAT: 0
SHORTNESS OF BREATH: 1
SINUS PAIN: 0
PALPITATIONS: 0
TREMORS: 0
HEADACHES: 0
FATIGUE: 0
VOMITING: 0

## 2024-09-24 ASSESSMENT — LIFESTYLE VARIABLES
HOW OFTEN DO YOU HAVE SIX OR MORE DRINKS ON ONE OCCASION: NEVER
HOW MANY STANDARD DRINKS CONTAINING ALCOHOL DO YOU HAVE ON A TYPICAL DAY: 1 OR 2
SKIP TO QUESTIONS 9-10: 1
HOW OFTEN DO YOU HAVE A DRINK CONTAINING ALCOHOL: 2-4 TIMES A MONTH
AUDIT-C TOTAL SCORE: 2

## 2024-09-24 ASSESSMENT — PAIN SCALES - GENERAL: PAINLEVEL: 0-NO PAIN

## 2024-09-24 ASSESSMENT — PATIENT HEALTH QUESTIONNAIRE - PHQ9
2. FEELING DOWN, DEPRESSED OR HOPELESS: NOT AT ALL
1. LITTLE INTEREST OR PLEASURE IN DOING THINGS: NOT AT ALL
SUM OF ALL RESPONSES TO PHQ9 QUESTIONS 1 & 2: 0

## 2024-09-24 NOTE — PATIENT INSTRUCTIONS
"Today we discussed your pulmonary history, symptoms and discussed plan moving forward.    -Ordering pulmonary function testing. (Please stop using your daily maintenance inhaler(s) for 2 days prior to testing date. You may use your as needed albuterol inhaler/nebulizer during this time frame if needed. Please resume your daily maintenance inhaler(s) after completion of testing).  -Start Albuterol Inhaler; 2 puffs every 4-6 hours as needed for shortness of breath. You can also take this 10-15 minutes prior to exertional activity to help \"prime\" your lungs.  -Stop Symbicort 80  -Start Symbicort 160; 2 puffs twice a day.  Rinse mouth after use.  This is your long-acting daily maintenance inhaler.  Please contact my office if you have any difficulty getting this prescription.  -Start cetirizine 10 mg daily in the evening.  This should help dry up your persistent postnasal drainage  -Continue following with your cardiologist for afib management    Thank you for visiting the pulmonary clinic today! It was a pleasure to participate in your care.  Please return to clinic 2-3 months or sooner if needed.    Joe Burnett, CNP  My Office Number: [phone]   CT Scheduling: [phone]  PFT/Follow Up Visit Scheduling: [phone]  My Nurse: SALLY Lawson    To reach the nurse, Lulu San RN, please call ([phone]) Lulu has a secure voice mail account if you want to leave a message.    **For immediate needs such as medication issues/refills, active sick symptoms/medical concerns; I ask that you please call the office and speak to the pulmonary nurse. MyChart messages do not come directly to me. There can sometimes be a delay before I am aware of any messages that were sent. Thank you.**        "
[FreeTextEntry3] : Injection Procedure Note:\par \par Patient Identification\par Name/: Verbal with patient and/or family\par \par Procedure Verification:\par Procedure confirmed with patient or family/designee\par Consent for procedure: Verbal Consent Given\par Relevant documentation completed, reviewed, and signed\par Clinical indications for procedure confirmed\par \par \par \par Time-out with all members of procedure team immediately prior to procedure:\par Correct patient identified. Agreement on procedure. Correct side and site.\par \par \par \par KNEE INJECTION (STEROID) - LEFT\par After verbal consent and identification of the correct patient and correct site, the superolateral left knee was prepped using alcohol swabs and betadine. This was allowed time to air dry. A mixture of 1cc DepoMedrol 40mg/ml, 3cc Lidocaine 1%, and 3cc Bupivacaine 0.5% was injected into the suprapatellar pouch using a sterile 22G needle after ethyl chloride spray for skin anesthesia. The patient tolerated the procedure well. After-care instructions were provided and included instructions to ice the area and to call if redness, pain, or fever develop.\par

## 2024-10-07 ENCOUNTER — HOSPITAL ENCOUNTER (OUTPATIENT)
Dept: RESPIRATORY THERAPY | Facility: HOSPITAL | Age: 84
Discharge: HOME | End: 2024-10-07
Payer: MEDICARE

## 2024-10-07 DIAGNOSIS — J45.40 MODERATE PERSISTENT ASTHMA WITHOUT COMPLICATION (HHS-HCC): ICD-10-CM

## 2024-10-07 LAB
MGC ASCENT PFT - FEV1 - POST: 2.31
MGC ASCENT PFT - FEV1 - POST: 2.31
MGC ASCENT PFT - FEV1 - PRE: 2.11
MGC ASCENT PFT - FEV1 - PRE: 2.11
MGC ASCENT PFT - FEV1 - PREDICTED: 2.87
MGC ASCENT PFT - FEV1 - PREDICTED: 2.87
MGC ASCENT PFT - FVC - POST: 3.99
MGC ASCENT PFT - FVC - POST: 3.99
MGC ASCENT PFT - FVC - PRE: 3.29
MGC ASCENT PFT - FVC - PRE: 3.29
MGC ASCENT PFT - FVC - PREDICTED: 3.99
MGC ASCENT PFT - FVC - PREDICTED: 3.99

## 2024-10-07 PROCEDURE — 94060 EVALUATION OF WHEEZING: CPT | Performed by: INTERNAL MEDICINE

## 2024-10-07 PROCEDURE — 94726 PLETHYSMOGRAPHY LUNG VOLUMES: CPT

## 2024-10-07 PROCEDURE — 94729 DIFFUSING CAPACITY: CPT

## 2024-10-07 PROCEDURE — 98960 EDU&TRN PT SELF-MGMT NQHP 1: CPT

## 2024-10-07 PROCEDURE — 94729 DIFFUSING CAPACITY: CPT | Performed by: INTERNAL MEDICINE

## 2024-10-07 PROCEDURE — 94760 N-INVAS EAR/PLS OXIMETRY 1: CPT

## 2024-10-07 PROCEDURE — 94664 DEMO&/EVAL PT USE INHALER: CPT

## 2024-10-07 PROCEDURE — 94060 EVALUATION OF WHEEZING: CPT

## 2024-10-07 PROCEDURE — 95012 NITRIC OXIDE EXP GAS DETER: CPT

## 2024-10-07 PROCEDURE — 94726 PLETHYSMOGRAPHY LUNG VOLUMES: CPT | Performed by: INTERNAL MEDICINE

## 2024-12-06 ASSESSMENT — ENCOUNTER SYMPTOMS
MYALGIAS: 0
DIARRHEA: 0
UNEXPECTED WEIGHT CHANGE: 0
ABDOMINAL PAIN: 0
EYE REDNESS: 0
SINUS PRESSURE: 0
ACTIVITY CHANGE: 0
VOMITING: 0
STRIDOR: 0
CHILLS: 0
TREMORS: 0
JOINT SWELLING: 0
EYE ITCHING: 0
AGITATION: 0
BACK PAIN: 0
HEADACHES: 0
BRUISES/BLEEDS EASILY: 0
ROS GI COMMENTS: DENIES ACID REFLUX
TROUBLE SWALLOWING: 0
SORE THROAT: 0
SINUS PAIN: 0
SLEEP DISTURBANCE: 0
FEVER: 0
RHINORRHEA: 0
CHEST TIGHTNESS: 0
PALPITATIONS: 0
ARTHRALGIAS: 0
NERVOUS/ANXIOUS: 0
WHEEZING: 1
NAUSEA: 0
WEAKNESS: 0
APPETITE CHANGE: 0
DIZZINESS: 0

## 2024-12-06 NOTE — PROGRESS NOTES
Patient: Jorge Esparza    [id number]  : 1940 -- AGE 84 y.o.    Provider: CECI Mott     Location [facility]   Service Date: 12/10/2024       Department of Medicine  Division of Pulmonary, Critical Care, and Sleep Medicine       [facility]  Follow Up Visit Note    HISTORY OF PRESENT ILLNESS     PCP: Dr. James Ngo  Cardiology: Dr. Hurt    HISTORY OF PRESENT ILLNESS   Jorge Esparza is a 84 y.o. male who presents to a [facility] for a follow up visit with concerns of asthma.   I have independently interviewed and examined the patient in the office and reviewed available records.    DATE OF LAST VISIT: 2024    Current History  Since last visit on 2024, patient completed previously ordered testing.  PFT from 10/7/2024 consistent of asthma; positive bronchodilator response and reactive small airways along with air trapping.  FeNO was elevated at 80 ppb.    On today's visit, He reports his symptoms remain pretty much the same from his last appointment.  He has been using the increased Symbicort 160 inhaler as prescribed.  He has not seen much of a notable symptomatic improvement since its use.  It was discovered today that he has not been using the albuterol inhaler at all.  There was some confusion that he should not be using both albuterol and Symbicort at the same time.  He was under the impression that once the Symbicort ran out he would switch over to albuterol instead.  I once again gave him reeducation about how/why these inhalers are to be used and how they can symptomatically benefit him.  Patient states that he is still experiencing a whistling/wheezing type sound almost exclusively when he lays down flat or on his left side.  He feels like there is something possibly stuck in his throat but his cough remains dry and is nonproductive.  He states that the symptoms pretty much go away  "as soon as he stands up.  He has been taking daily cetirizine which I started him on last visit and he does notice that he is more dry and is not having as much throat clearing as before.  I discussed with him that it sounds like he could possibly be experiencing some silent reflux which could be triggering all of his symptoms exclusively when he lays down.  Will trial him on a course of pantoprazole and see how he does.   ACT Today: 15    Prior Visits & History   09/24/24: Patient presents to pulmonary clinic with referral by primary care for concerns of wheezing.  There is no PFT on record.  He has a cardiac history consisting of atrial fibrillation; currently on Xarelto.    On today's visit, the patient reports he has noticed when he lays down he will hear wheezing. Has been ongoing since around February 2024. Was given Symbicort 80 and medrol dose packs for the symptoms. Symptoms only occur when he is laying flat.   When breathing out, can hear a slight noise. Describes it as \"musical\". Depending on what side he lays on, can occur at random. Intermittent orthopnea. Has been given 2 medrol dose packs. While on this, symptoms will temporarily resolve but symptoms   will return within about 1 week. With his Symbicort 80 inhaler; states he would use 2 puffs BID. Then dropped it down to 2 puffs once a day. Symptoms are intermittent and can fluctuate. Reports a lot of throat clearing and having seasonal allergy issues.   Dealing with sinus congestion now also. No other inhaler therapy in the past. He reports hx of afib; sees Dr. Hurt from cardiology. Hx of cardioversions years back. Denies CP, palps. Denies SOB at rest. No notable CASILLAS; only if he is really pushing himself.   No notable wheezing at these times. Denies chronic cough. Some cough in the AM and at night when laying down due to throat clearing. Tried Flonase in the past. Denies GERD.     Denies premature birth. No childhood pulmonary issues growing up. No " pulmonary hospitalizations. Has never been on home oxygen therapy before.    Has never completed a sleep study before. No notable snoring now. Not waking up choking. No AM headaches. Some daytime fatigue.  CAT Today: 14  ACT Today: 16    REVIEW OF SYSTEMS     REVIEW OF SYSTEMS  Review of Systems   Constitutional:  Positive for fatigue. Negative for activity change, appetite change, chills, fever and unexpected weight change.   HENT:  Positive for congestion, postnasal drip and voice change. Negative for rhinorrhea, sinus pressure, sinus pain, sneezing, sore throat and trouble swallowing.         + throat clearing   Eyes:  Negative for redness and itching.   Respiratory:  Positive for cough and wheezing. Negative for chest tightness, shortness of breath and stridor.    Cardiovascular:  Negative for chest pain, palpitations and leg swelling.        +orthopnea   Gastrointestinal:  Negative for abdominal pain, diarrhea, nausea and vomiting.        Denies acid reflux   Musculoskeletal:  Negative for arthralgias, back pain, joint swelling and myalgias.   Skin:  Negative for rash.   Allergic/Immunologic: Negative for immunocompromised state.   Neurological:  Negative for dizziness, tremors, weakness and headaches.   Hematological:  Does not bruise/bleed easily.   Psychiatric/Behavioral:  Negative for agitation and sleep disturbance. The patient is not nervous/anxious.         Denies depression   All other systems reviewed and are negative.      ALLERGIES AND MEDICATIONS     ALLERGIES  No Known Allergies    MEDICATIONS  Current Outpatient Medications   Medication Sig Dispense Refill    albuterol 90 mcg/actuation inhaler Inhale 2 puffs every 4 hours if needed for wheezing or shortness of breath (You may also use this 10-15 minutes prior to exertional activity as needed). 18 g 5    budesonide-formoteroL (Symbicort) 160-4.5 mcg/actuation inhaler Inhale 2 puffs 2 times a day. Rinse mouth with water after use to reduce  aftertaste and incidence of candidiasis. Do not swallow. 10.2 g 3    cetirizine (ZyrTEC) 10 mg tablet Take 1 tablet (10 mg) by mouth once daily. Take in the evening before bedtime 30 tablet 3    metoprolol succinate XL (Toprol-XL) 50 mg 24 hr tablet Take 1 tablet (50 mg) by mouth 2 times a day.      rivaroxaban (Xarelto) 15 mg tablet Take 1 tablet (15 mg) by mouth once daily.       No current facility-administered medications for this visit.       PAST HISTORY     PAST MEDICAL HISTORY  He  has a past medical history of Essential (primary) hypertension (06/14/2013).    PAST SURGICAL HISTORY  History reviewed. No pertinent surgical history.    IMMUNIZATION HISTORY  Immunization History   Administered Date(s) Administered    COVID-19, mRNA, LNP-S, PF, 30 mcg/0.3 mL dose 01/27/2021, 02/24/2021, 01/05/2022       SOCIAL HISTORY  He  reports that he quit smoking about 50 years ago. His smoking use included cigarettes, pipe, and cigars. He started smoking about 66 years ago. He has a 12.7 pack-year smoking history. He has never been exposed to tobacco smoke. He has never used smokeless tobacco. He reports current alcohol use of about 2.0 standard drinks of alcohol per week. He reports that he does not currently use drugs.     OCCUPATIONAL/ENVIRONMENTAL HISTORY  Previously worked as: , [employer]; no notable exposures  Currently works as: retired  DOES/DOES NOT: does not have known exposure to asbestos, silica, beryllium or inhaled metals.  DOES/DOES NOT: does not have exposure to birds or exotic animals.  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      PHYSICAL EXAM     VITAL SIGNS: BP (!) 168/101   Pulse 80 Comment: IRREGULAR  Wt 103 kg (226 lb)   SpO2 98%   BMI 29.82 kg/m²      PREVIOUS WEIGHTS:  Wt Readings from Last 3 Encounters:   12/10/24 103 kg (226 lb)   09/24/24 102 kg (225 lb)   09/17/24 103 kg (228 lb)       Physical Exam  Vitals reviewed.  "  Constitutional:       General: He is not in acute distress.     Appearance: Normal appearance. He is not ill-appearing or toxic-appearing.   HENT:      Head: Normocephalic.      Nose: No rhinorrhea.   Cardiovascular:      Rate and Rhythm: Normal rate. Rhythm irregular.      Heart sounds: Normal heart sounds.   Pulmonary:      Effort: Pulmonary effort is normal. No respiratory distress.      Breath sounds: No stridor. No wheezing, rhonchi or rales.   Abdominal:      General: Abdomen is flat.   Musculoskeletal:         General: Normal range of motion.      Right lower leg: No edema.      Left lower leg: No edema.   Skin:     General: Skin is warm and dry.      Nails: There is no clubbing.   Neurological:      General: No focal deficit present.      Mental Status: He is alert and oriented to person, place, and time.   Psychiatric:         Mood and Affect: Mood normal.         Behavior: Behavior normal.         Judgment: Judgment normal.         RESULTS/DATA     Pulmonary Function Test Results   PFT   10/7/24: FEV1/FVC: 64, FEV1: 2.11 (73%), FVC: 3.29 (82%), +BD response, [id number]: 47% (55% change with BD admin), T.20 (82%), RV/T%, DLCO: 79%      FeNO   10/7/24: 80 ppb (high)     Chest Radiograph   CXR  24: IMPRESSION: No evidence of acute intrathoracic abnormality.    Chest CT Scan     No results found for this or any previous visit from the past 365 days.      Echocardiogram & Cardiac Studies     No results found for this or any previous visit from the past 365 days.       Labwork & Pathology   Complete Blood Count  Lab Results   Component Value Date    WBC 4.9 2024    HGB 15.7 2024    HCT 48.0 2024    MCV 95 2024     2024     Peripheral Eosinophil Count:   Eosinophils Absolute (x10E9/L)   Date Value   2023 0.19   2022 0.23   2021 0.13     Immunocap IgE  No results found for: \"ICIGE\"    Bronchoscopy & Pathology/Cultures       ASSESSMENT/PLAN "     Mr. Esparza is a 84 y.o. male; was referred to the [facility] for evaluation of wheezing.    Problem List and Orders  Diagnoses and all orders for this visit:  Moderate persistent asthma without complication (HHS-HCC)  Post-nasal drainage  Atrial fibrillation, unspecified type (Multi)        Assessment and Plan / Recommendations:  Severe Asthma: Has never formally been diagnosed with asthma and has never completed PFT testing.  He reports that he does not have any notable shortness of breath at rest or CASILLAS; unless really pushing/exerting himself.  When he goes to lay down horizontal at night he does experience orthopnea and will also hear wheezing when this occurs.  Has been going on since around February 2024.  At this time he was given Symbicort 80 by his PCP and has also received 2 Medrol Dosepaks since this timeframe.  States that the symptoms will completely resolve while on the Medrol pack and will usually return within 1 week of finishing the medicine.  He will take the Symbicort 80 sometimes twice a day; other times once a day.  Does not feel like the inhaler fully takes care of the symptoms. PFT from 10/7/2024 consistent of asthma; positive bronchodilator response and reactive small airways along with air trapping.  FeNO was elevated at 80 ppb.  -Continue Symbicort 160; 2 puffs twice daily.  Rinse mouth after use (has not seen a huge improvement since stepping up therapy)  -Start albuterol HFA as needed  -On 12/10/24 visit; It was discovered today that he has not been using the albuterol inhaler at all.  There was some confusion that he should not be using both albuterol and Symbicort at the same time.  He was under the impression that once the Symbicort ran out he would switch over to albuterol instead.  I once again gave him reeducation about how/why these inhalers are to be used and how they can symptomatically benefit him.   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  Chronic Cough/PND: Does report frequent throat clearing; usually worse when laying down.  Also experiences some increased sinus congestion.  -Continue cetirizine 10 mg daily in the evening (some mild improvement since starting it?)  -Previously used Flonase as needed which he found helpful at times  -He still admits to hearing a wheezing like sound when he lays down flat or on his left side. Feels like he has something caught in his throat but cannot cough anything up. States that this sensation/symptoms go away when he stands upright  -Start pantoprazole 20 mg daily. Will trial GERD treatment and see if this helps improve the symptoms he is describing. He could have silent reflux as well as PND that are both triggering cough and triggering his underlying asthma.    3.  A-fib: Was diagnosed a number of years ago and underwent 2 separate cardioversions; both of which failed.  Currently on Xarelto.  -Continue following with Dr. Hurt from cardiology for management    RTC 3 months    Joe Burnett, CNP  [facility]    *This note was dictated using DRAGON speech recognition software and was corrected for spelling or grammatical errors, but despite proofreading several typographical errors might be present that might affect the meaning of the content.*

## 2024-12-10 ENCOUNTER — OFFICE VISIT (OUTPATIENT)
Dept: PULMONOLOGY | Facility: CLINIC | Age: 84
End: 2024-12-10
Payer: MEDICARE

## 2024-12-10 ENCOUNTER — LAB (OUTPATIENT)
Dept: LAB | Facility: LAB | Age: 84
End: 2024-12-10
Payer: MEDICARE

## 2024-12-10 VITALS
BODY MASS INDEX: 29.82 KG/M2 | WEIGHT: 226 LBS | HEART RATE: 80 BPM | SYSTOLIC BLOOD PRESSURE: 168 MMHG | OXYGEN SATURATION: 98 % | DIASTOLIC BLOOD PRESSURE: 101 MMHG

## 2024-12-10 DIAGNOSIS — I48.91 ATRIAL FIBRILLATION, UNSPECIFIED TYPE (MULTI): ICD-10-CM

## 2024-12-10 DIAGNOSIS — R09.82 POST-NASAL DRAINAGE: ICD-10-CM

## 2024-12-10 DIAGNOSIS — J45.50 SEVERE PERSISTENT ASTHMA WITHOUT COMPLICATION (MULTI): ICD-10-CM

## 2024-12-10 DIAGNOSIS — J45.50 SEVERE PERSISTENT ASTHMA WITHOUT COMPLICATION (MULTI): Primary | ICD-10-CM

## 2024-12-10 DIAGNOSIS — R05.3 CHRONIC COUGH: ICD-10-CM

## 2024-12-10 PROBLEM — J45.40 MODERATE PERSISTENT ASTHMA WITHOUT COMPLICATION (HHS-HCC): Status: ACTIVE | Noted: 2024-12-10

## 2024-12-10 LAB
BASOPHILS # BLD AUTO: 0.05 X10*3/UL (ref 0–0.1)
BASOPHILS NFR BLD AUTO: 0.8 %
EOSINOPHIL # BLD AUTO: 0.43 X10*3/UL (ref 0–0.4)
EOSINOPHIL NFR BLD AUTO: 7.1 %
ERYTHROCYTE [DISTWIDTH] IN BLOOD BY AUTOMATED COUNT: 13 % (ref 11.5–14.5)
HCT VFR BLD AUTO: 51 % (ref 41–52)
HGB BLD-MCNC: 16.6 G/DL (ref 13.5–17.5)
IMM GRANULOCYTES # BLD AUTO: 0.03 X10*3/UL (ref 0–0.5)
IMM GRANULOCYTES NFR BLD AUTO: 0.5 % (ref 0–0.9)
LYMPHOCYTES # BLD AUTO: 0.85 X10*3/UL (ref 0.8–3)
LYMPHOCYTES NFR BLD AUTO: 14 %
MCH RBC QN AUTO: 31.4 PG (ref 26–34)
MCHC RBC AUTO-ENTMCNC: 32.5 G/DL (ref 32–36)
MCV RBC AUTO: 97 FL (ref 80–100)
MONOCYTES # BLD AUTO: 0.89 X10*3/UL (ref 0.05–0.8)
MONOCYTES NFR BLD AUTO: 14.7 %
NEUTROPHILS # BLD AUTO: 3.81 X10*3/UL (ref 1.6–5.5)
NEUTROPHILS NFR BLD AUTO: 62.9 %
NRBC BLD-RTO: 0 /100 WBCS (ref 0–0)
PLATELET # BLD AUTO: 220 X10*3/UL (ref 150–450)
RBC # BLD AUTO: 5.28 X10*6/UL (ref 4.5–5.9)
WBC # BLD AUTO: 6.1 X10*3/UL (ref 4.4–11.3)

## 2024-12-10 PROCEDURE — 99215 OFFICE O/P EST HI 40 MIN: CPT | Performed by: NURSE PRACTITIONER

## 2024-12-10 PROCEDURE — 85025 COMPLETE CBC W/AUTO DIFF WBC: CPT

## 2024-12-10 PROCEDURE — 36415 COLL VENOUS BLD VENIPUNCTURE: CPT

## 2024-12-10 PROCEDURE — 1159F MED LIST DOCD IN RCRD: CPT | Performed by: NURSE PRACTITIONER

## 2024-12-10 PROCEDURE — 1123F ACP DISCUSS/DSCN MKR DOCD: CPT | Performed by: NURSE PRACTITIONER

## 2024-12-10 PROCEDURE — 1126F AMNT PAIN NOTED NONE PRSNT: CPT | Performed by: NURSE PRACTITIONER

## 2024-12-10 PROCEDURE — 1036F TOBACCO NON-USER: CPT | Performed by: NURSE PRACTITIONER

## 2024-12-10 PROCEDURE — 1160F RVW MEDS BY RX/DR IN RCRD: CPT | Performed by: NURSE PRACTITIONER

## 2024-12-10 PROCEDURE — 82785 ASSAY OF IGE: CPT

## 2024-12-10 RX ORDER — PANTOPRAZOLE SODIUM 20 MG/1
20 TABLET, DELAYED RELEASE ORAL
Qty: 30 TABLET | Refills: 3 | Status: SHIPPED | OUTPATIENT
Start: 2024-12-10

## 2024-12-10 ASSESSMENT — LIFESTYLE VARIABLES
HOW MANY STANDARD DRINKS CONTAINING ALCOHOL DO YOU HAVE ON A TYPICAL DAY: 1 OR 2
HOW OFTEN DO YOU HAVE A DRINK CONTAINING ALCOHOL: 2-4 TIMES A MONTH
AUDIT-C TOTAL SCORE: 2
HOW OFTEN DO YOU HAVE SIX OR MORE DRINKS ON ONE OCCASION: NEVER
SKIP TO QUESTIONS 9-10: 1

## 2024-12-10 ASSESSMENT — PAIN SCALES - GENERAL: PAINLEVEL_OUTOF10: 0-NO PAIN

## 2024-12-10 ASSESSMENT — ENCOUNTER SYMPTOMS
SHORTNESS OF BREATH: 0
COUGH: 1
FATIGUE: 1
VOICE CHANGE: 1

## 2024-12-10 ASSESSMENT — PATIENT HEALTH QUESTIONNAIRE - PHQ9
1. LITTLE INTEREST OR PLEASURE IN DOING THINGS: NOT AT ALL
SUM OF ALL RESPONSES TO PHQ9 QUESTIONS 1 & 2: 0
2. FEELING DOWN, DEPRESSED OR HOPELESS: NOT AT ALL

## 2024-12-10 NOTE — PATIENT INSTRUCTIONS
"Today we discussed your test results, symptoms and plan moving forward.    -Ordering 2 labs to be drawn today  -Start pantoprazole 20 mg daily. Will see if this medication helps calm things down when you lay down flat.  -Start Albuterol Inhaler; 2 puffs every 4-6 hours as needed for shortness of breath. You can also take this 10-15 minutes prior to exertional activity to help \"prime\" your lungs. This is a short acting inhaler meant to be used for any breakthrough symptoms (wheezing, shortness of breath, etc). This can be used in conjunction with your current Symbicort inhaler. They work differently.  -Continue Symbicort 160; 2 puffs twice a day.  Rinse mouth after use.  This is your long-acting daily maintenance inhaler.  Please contact my office if you have any difficulty getting this prescription.  -Continue cetirizine 10 mg daily in the evening.  This should help dry up your persistent postnasal drainage  -Continue following with your cardiologist for afib management    Thank you for visiting the pulmonary clinic today! It was a pleasure to participate in your care.  Please return to clinic 3 months or sooner if needed.    Joe Burnett, CNP  My Office Number: [phone]   CT Scheduling: [phone]  PFT/Follow Up Visit Scheduling: [phone]  My Nurse: SALLY Lawson    To reach the nurse, Lulu San RN, please call ([phone]) Lulu has a secure voice mail account if you want to leave a message.    **For immediate needs such as medication issues/refills, active sick symptoms/medical concerns; I ask that you please call the office and speak to the pulmonary nurse. MyChart messages do not come directly to me. There can sometimes be a delay before I am aware of any messages that were sent. Thank you.**    "

## 2024-12-11 LAB — TOTAL IGE SMQN RAST: 805 KU/L

## 2025-02-12 DIAGNOSIS — R09.82 POST-NASAL DRAINAGE: ICD-10-CM

## 2025-02-12 RX ORDER — CETIRIZINE HYDROCHLORIDE 10 MG/1
10 TABLET ORAL DAILY
Qty: 30 TABLET | Refills: 5 | Status: SHIPPED | OUTPATIENT
Start: 2025-02-12

## 2025-03-06 NOTE — PROGRESS NOTES
Patient: Jorge Esparza    MRN: [id number]  : 1940 -- AGE: 84 y.o.    Provider: CECI Mott     Location [facility]   Service Date: 3/11/2025         Department of Medicine  Division of Pulmonary, Critical Care, and Sleep Medicine     [facility]  Follow Up Visit Note    HISTORY OF PRESENT ILLNESS     PCP: Dr. James Ngo  Cardiology: Dr. Hurt    HISTORY OF PRESENT ILLNESS   Jorge Esparza is a 84 y.o. male who presents to a [facility] for a follow up visit with concerns of asthma.   I have independently interviewed and examined the patient in the office and reviewed available records.    DATE OF LAST VISIT: 12/10/2024    Current History  On today's visit, He reports his overall breathing, wheezing and asthma symptoms are very stable and well-controlled at this time.  He remains on Symbicort 162 puffs twice daily.  Typically uses albuterol HFA once a day in the evening before bed.  He still reports having orthopnea.  He has been taking daily pantoprazole as a previous trial to see if silent GERD could be triggering his coughing symptoms.  Despite this therapy he has seen no notable improvement.  He does also admit that he has been seeing an increase in intermittent voice hoarseness as well as increased throat clearing.  He remains on cetirizine daily.  Will trial him on a course of Atrovent nasal spray and also placed referral to an ENT for further follow-up if still not well-controlled.  Patient aware and agreeable to plan discussed.  ACT: 19    Prior Visits & History   12/10/24: Since last visit on 2024, patient completed previously ordered testing.  PFT from 10/7/2024 consistent of asthma; positive bronchodilator response and reactive small airways along with air trapping.    FeNO was elevated at 80 ppb.On today's visit, He reports his symptoms remain pretty much the same from his last  "appointment.  He has been using the increased Symbicort 160 inhaler as prescribed.    He has not seen much of a notable symptomatic improvement since its use.  It was discovered today that he has not been using the albuterol inhaler at all.  There was some confusion that he should not be using both   albuterol and Symbicort at the same time.  He was under the impression that once the Symbicort ran out he would switch over to albuterol instead.  I once again gave him reeducation about how/why these   inhalers are to be used and how they can symptomatically benefit him.  Patient states that he is still experiencing a whistling/wheezing type sound almost exclusively when he lays down flat or on his left side.    He feels like there is something possibly stuck in his throat but his cough remains dry and is nonproductive.  He states that the symptoms pretty much go away as soon as he stands up.    He has been taking daily cetirizine which I started him on last visit and he does notice that he is more dry and is not having as much throat clearing as before.  I discussed with him that it sounds like   he could possibly be experiencing some silent reflux which could be triggering all of his symptoms exclusively when he lays down.  Will trial him on a course of pantoprazole and see how he does.   ACT: 15    09/24/24: Patient presents to pulmonary clinic with referral by primary care for concerns of wheezing.  There is no PFT on record.  He has a cardiac history consisting of atrial fibrillation; currently on Xarelto.  On today's visit, the patient reports he has noticed when he lays down he will hear wheezing. Has been ongoing since around February 2024. Was given Symbicort 80 and medrol dose packs for the symptoms.   Symptoms only occur when he is laying flat.   When breathing out, can hear a slight noise. Describes it as \"musical\". Depending on what side he lays on, can occur at random. Intermittent orthopnea. Has been " given 2 medrol dose packs.   While on this, symptoms will temporarily resolve but symptoms   will return within about 1 week. With his Symbicort 80 inhaler; states he would use 2 puffs BID. Then dropped it down to 2 puffs once a day. Symptoms are intermittent and can fluctuate.   Reports a lot of throat clearing and having seasonal allergy issues.   Dealing with sinus congestion now also. No other inhaler therapy in the past. He reports hx of afib; sees Dr. Hurt from cardiology. Hx of cardioversions years back. Denies CP, palps.   Denies SOB at rest. No notable CASILLAS; only if he is really pushing himself.   No notable wheezing at these times. Denies chronic cough. Some cough in the AM and at night when laying down due to throat clearing. Tried Flonase in the past. Denies GERD.   Denies premature birth. No childhood pulmonary issues growing up. No pulmonary hospitalizations. Has never been on home oxygen therapy before.  Has never completed a sleep study before. No notable snoring now. Not waking up choking. No AM headaches. Some daytime fatigue.  CAT: 14  ACT: 16    REVIEW OF SYSTEMS     REVIEW OF SYSTEMS  Review of Systems   Constitutional:  Negative for activity change, appetite change, chills, fatigue, fever and unexpected weight change.   HENT:  Positive for postnasal drip and voice change. Negative for congestion, rhinorrhea, sinus pressure, sinus pain, sneezing, sore throat and trouble swallowing.         + throat clearing   Eyes:  Negative for redness and itching.   Respiratory:  Positive for cough. Negative for chest tightness, shortness of breath, wheezing and stridor.    Cardiovascular:  Negative for chest pain, palpitations and leg swelling.        Denies orthopnea   Gastrointestinal:  Negative for abdominal pain, diarrhea, nausea and vomiting.        Denies acid reflux   Musculoskeletal:  Negative for arthralgias, back pain, joint swelling and myalgias.   Skin:  Negative for rash.   Allergic/Immunologic:  Negative for immunocompromised state.   Neurological:  Negative for dizziness, tremors, weakness and headaches.   Hematological:  Does not bruise/bleed easily.   Psychiatric/Behavioral:  Negative for agitation and sleep disturbance. The patient is not nervous/anxious.         Denies depression   All other systems reviewed and are negative.      ALLERGIES & MEDICATIONS     ALLERGIES  No Known Allergies    MEDICATIONS  Current Outpatient Medications   Medication Sig Dispense Refill    albuterol 90 mcg/actuation inhaler Inhale 2 puffs every 4 hours if needed for wheezing or shortness of breath (You may also use this 10-15 minutes prior to exertional activity as needed). 18 g 5    budesonide-formoteroL (Symbicort) 160-4.5 mcg/actuation inhaler Inhale 2 puffs 2 times a day. Rinse mouth with water after use to reduce aftertaste and incidence of candidiasis. Do not swallow. 10.2 g 3    cetirizine (ZyrTEC) 10 mg tablet Take 1 tablet (10 mg) by mouth once daily. Take in the evening before bedtime 30 tablet 5    metoprolol succinate XL (Toprol-XL) 50 mg 24 hr tablet Take 1 tablet (50 mg) by mouth 2 times a day.      rivaroxaban (Xarelto) 15 mg tablet Take 1 tablet (15 mg) by mouth once daily.      ipratropium (Atrovent) 21 mcg (0.03 %) nasal spray Administer 2 sprays into each nostril every 12 hours. 30 mL 3     No current facility-administered medications for this visit.       PAST HISTORY     PAST MEDICAL HISTORY  He  has a past medical history of Essential (primary) hypertension (06/14/2013).    PAST SURGICAL HISTORY  History reviewed. No pertinent surgical history.    IMMUNIZATION HISTORY  Immunization History   Administered Date(s) Administered    COVID-19, mRNA, LNP-S, PF, 30 mcg/0.3 mL dose 01/27/2021, 02/24/2021, 01/05/2022       SOCIAL HISTORY  He  reports that he quit smoking about 51 years ago. His smoking use included cigarettes, pipe, and cigars. He started smoking about 67 years ago. He has a 12.8 pack-year  "smoking history. He has never been exposed to tobacco smoke. He has never used smokeless tobacco. He reports current alcohol use of about 2.0 standard drinks of alcohol per week. He reports that he does not currently use drugs.     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    PHYSICAL EXAM     VITAL SIGNS: BP (!) 149/93   Pulse 72   Ht 1.854 m (6' 1\")   Wt 103 kg (226 lb)   SpO2 97%   BMI 29.82 kg/m²      PREVIOUS WEIGHTS:  Wt Readings from Last 3 Encounters:   03/11/25 103 kg (226 lb)   12/10/24 103 kg (226 lb)   09/24/24 102 kg (225 lb)       Physical Exam  Vitals reviewed.   Constitutional:       General: He is not in acute distress.     Appearance: Normal appearance. He is not ill-appearing or toxic-appearing.   HENT:      Head: Normocephalic.      Nose: No rhinorrhea.   Cardiovascular:      Rate and Rhythm: Normal rate. Rhythm irregular.      Heart sounds: Normal heart sounds.   Pulmonary:      Effort: Pulmonary effort is normal. No respiratory distress.      Breath sounds: Normal breath sounds. No stridor. No wheezing, rhonchi or rales.   Abdominal:      General: Abdomen is flat.   Musculoskeletal:         General: Normal range of motion.      Right lower leg: No edema.      Left lower leg: No edema.   Skin:     General: Skin is warm and dry.      Nails: There is no clubbing.   Neurological:      General: No focal deficit present.      Mental Status: He is alert and oriented to person, place, and time.   Psychiatric:         Mood and Affect: Mood normal.         Behavior: Behavior normal.         Judgment: Judgment normal.         RESULTS/DATA     Pulmonary Function Test Results   PFT   10/7/24: FEV1/FVC: 64, FEV1: 2.11 " (73%), FVC: 3.29 (82%), +BD response, UCT30-77: 47% (55% change with BD admin), T.20 (82%), RV/T%, DLCO: 79%      FeNO   10/7/24: 80 ppb (high)     Chest Radiograph   CXR  24: IMPRESSION: No evidence of acute intrathoracic abnormality.    Chest CT Scan     No results found for this or any previous visit from the past 365 days.      Echocardiogram & Cardiac Studies     No results found for this or any previous visit from the past 365 days.       Labwork & Pathology   Complete Blood Count  Lab Results   Component Value Date    WBC 6.1 12/10/2024    HGB 16.6 12/10/2024    HCT 51.0 12/10/2024    MCV 97 12/10/2024     12/10/2024     Peripheral Eosinophil Count:   Eosinophils Absolute   Date Value   12/10/2024 0.43 x10*3/uL (H)   2023 0.19 x10E9/L   2022 0.23 x10E9/L   2021 0.13 x10E9/L   2019 0.19 x10E9/L   2018 0.28 x10E9/L     Immunocap IgE  Lab Results   Component Value Date    ICIGE 805 (H) 12/10/2024       Bronchoscopy & Pathology/Cultures     ASSESSMENT/PLAN     Mr. Esparza is a 84 y.o. male; presents to the [facility] for follow up of asthma    Problem List and Orders  Diagnoses and all orders for this visit:  Severe persistent asthma without complication (Multi)  Post-nasal drainage  -     ipratropium (Atrovent) 21 mcg (0.03 %) nasal spray; Administer 2 sprays into each nostril every 12 hours.  -     Referral to ENT; Future  Chronic cough  -     Referral to ENT; Future  Atrial fibrillation, unspecified type (Multi)      Assessment and Plan / Recommendations:  Severe Asthma: Has never formally been diagnosed with asthma and has never completed PFT testing.  He reports that he does not have any notable shortness of breath at rest or CASILLAS; unless really pushing/exerting himself.  When he goes to lay down horizontal at night he does experience orthopnea and will also hear wheezing when this occurs.  Has been going on since around  February 2024.  At this time he was given Symbicort 80 by his PCP and has also received 2 Medrol Dosepaks since this timeframe.  States that the symptoms will completely resolve while on the Medrol pack and will usually return within 1 week of finishing the medicine.  He will take the Symbicort 80 sometimes twice a day; other times once a day.  Does not feel like the inhaler fully takes care of the symptoms. PFT from 10/7/2024 consistent of asthma; positive bronchodilator response and reactive small airways along with air trapping.  FeNO was elevated at 80 ppb.  -Continue Symbicort 160; 2 puffs twice daily.  Rinse mouth after use (has not seen a huge improvement since stepping up therapy)  -Continue albuterol HFA as needed  -Phenotyping shows both elevation in Eosinophils (430; FeNO 80) as well as IgE allergy level (805)  -He thinks he took a course of oral steroids about 15 years ago and remembers his breathing notably improved at this time  -May need to consider biologic therapy in the future     2.  Chronic Cough/PND: Does report frequent throat clearing; usually worse when laying down.  Also experiences some increased sinus congestion.  -Continue cetirizine 10 mg daily in the evening (some mild improvement since starting it?)  -Previously used Flonase as needed which he found helpful at times  -He still admits to hearing a wheezing like sound when he lays down flat or on his left side. Feels like he has something caught in his throat but cannot cough anything up. States that this sensation/symptoms go away when he stands upright  -Stop pantoprazole (trialed a course of this to see if silent GERD could be triggering his symptoms; did not see any symptomatic benefit from it.)  -Start Atrovent nasal spray  -Referral to ENT; Jerson Dozier CNP for further evaluation of his persistent voice hoarseness    3.  A-fib: Was diagnosed a number of years ago and underwent 2 separate cardioversions; both of which failed.   Currently on Xarelto.  -Continue following with Dr. Hurt from cardiology for management    RTC 6 months    Joe Burnett, CNP  [facility] Nurse Practitioner    *This note was dictated using DRAGON speech recognition software and was corrected for spelling or grammatical errors, but despite proofreading several typographical errors might be present that might affect the meaning of the content.*

## 2025-03-11 ENCOUNTER — OFFICE VISIT (OUTPATIENT)
Dept: PULMONOLOGY | Facility: CLINIC | Age: 85
End: 2025-03-11
Payer: MEDICARE

## 2025-03-11 VITALS
WEIGHT: 226 LBS | HEIGHT: 73 IN | SYSTOLIC BLOOD PRESSURE: 149 MMHG | BODY MASS INDEX: 29.95 KG/M2 | HEART RATE: 72 BPM | DIASTOLIC BLOOD PRESSURE: 93 MMHG | OXYGEN SATURATION: 97 %

## 2025-03-11 DIAGNOSIS — R09.82 POST-NASAL DRAINAGE: ICD-10-CM

## 2025-03-11 DIAGNOSIS — J45.50 SEVERE PERSISTENT ASTHMA WITHOUT COMPLICATION (MULTI): Primary | ICD-10-CM

## 2025-03-11 DIAGNOSIS — R05.3 CHRONIC COUGH: ICD-10-CM

## 2025-03-11 DIAGNOSIS — I48.91 ATRIAL FIBRILLATION, UNSPECIFIED TYPE (MULTI): ICD-10-CM

## 2025-03-11 PROCEDURE — 1159F MED LIST DOCD IN RCRD: CPT | Performed by: NURSE PRACTITIONER

## 2025-03-11 PROCEDURE — 1126F AMNT PAIN NOTED NONE PRSNT: CPT | Performed by: NURSE PRACTITIONER

## 2025-03-11 PROCEDURE — 99214 OFFICE O/P EST MOD 30 MIN: CPT | Performed by: NURSE PRACTITIONER

## 2025-03-11 PROCEDURE — 1123F ACP DISCUSS/DSCN MKR DOCD: CPT | Performed by: NURSE PRACTITIONER

## 2025-03-11 PROCEDURE — 1036F TOBACCO NON-USER: CPT | Performed by: NURSE PRACTITIONER

## 2025-03-11 RX ORDER — IPRATROPIUM BROMIDE 21 UG/1
2 SPRAY, METERED NASAL EVERY 12 HOURS
Qty: 30 ML | Refills: 3 | Status: SHIPPED | OUTPATIENT
Start: 2025-03-11

## 2025-03-11 ASSESSMENT — ENCOUNTER SYMPTOMS
WHEEZING: 0
SLEEP DISTURBANCE: 0
ROS GI COMMENTS: DENIES ACID REFLUX
NAUSEA: 0
ACTIVITY CHANGE: 0
VOMITING: 0
SHORTNESS OF BREATH: 0
VOICE CHANGE: 1
BACK PAIN: 0
UNEXPECTED WEIGHT CHANGE: 0
SINUS PAIN: 0
COUGH: 1
EYE REDNESS: 0
BRUISES/BLEEDS EASILY: 0
TREMORS: 0
CHILLS: 0
JOINT SWELLING: 0
APPETITE CHANGE: 0
ABDOMINAL PAIN: 0
MYALGIAS: 0
TROUBLE SWALLOWING: 0
HEADACHES: 0
FEVER: 0
NERVOUS/ANXIOUS: 0
STRIDOR: 0
WEAKNESS: 0
FATIGUE: 0
SORE THROAT: 0
PALPITATIONS: 0
EYE ITCHING: 0
RHINORRHEA: 0
DIZZINESS: 0
AGITATION: 0
ARTHRALGIAS: 0
DIARRHEA: 0
CHEST TIGHTNESS: 0
SINUS PRESSURE: 0

## 2025-03-11 ASSESSMENT — LIFESTYLE VARIABLES
HOW OFTEN DO YOU HAVE SIX OR MORE DRINKS ON ONE OCCASION: NEVER
SKIP TO QUESTIONS 9-10: 1
HOW OFTEN DO YOU HAVE A DRINK CONTAINING ALCOHOL: 2-4 TIMES A MONTH
AUDIT-C TOTAL SCORE: 2
HOW MANY STANDARD DRINKS CONTAINING ALCOHOL DO YOU HAVE ON A TYPICAL DAY: 1 OR 2

## 2025-03-11 ASSESSMENT — PATIENT HEALTH QUESTIONNAIRE - PHQ9
SUM OF ALL RESPONSES TO PHQ9 QUESTIONS 1 & 2: 0
1. LITTLE INTEREST OR PLEASURE IN DOING THINGS: NOT AT ALL
2. FEELING DOWN, DEPRESSED OR HOPELESS: NOT AT ALL

## 2025-03-11 ASSESSMENT — PAIN SCALES - GENERAL: PAINLEVEL_OUTOF10: 0-NO PAIN

## 2025-03-11 NOTE — PATIENT INSTRUCTIONS
"Today we discussed your test results, symptoms and plan moving forward.    -Stop pantoprazole  -Start Atrovent nasal spray; 1-2 sprays each nostril twice a day  -Referral to ENT; Jerson Dozier CNP  -Continue Albuterol Inhaler; 2 puffs every 4-6 hours as needed for shortness of breath. You can also take this 10-15 minutes prior to exertional activity to help \"prime\" your lungs. This is a short acting inhaler meant to be used for any breakthrough symptoms (wheezing, shortness of breath, etc). This can be used in conjunction with your current Symbicort inhaler. They work differently.  -Continue Symbicort 160; 2 puffs twice a day.  Rinse mouth after use.  This is your long-acting daily maintenance inhaler.  Please contact my office if you have any difficulty getting this prescription.  -Continue cetirizine 10 mg daily in the evening.  This should help dry up your persistent postnasal drainage  -Continue following with your cardiologist for afib management    Thank you for visiting the pulmonary clinic today! It was a pleasure to participate in your care.  Please return to clinic 6 months or sooner if needed.    Joe Burnett CNP  My Office Number: [phone]   CT Scheduling: [phone]  PFT (Pulmonary Function Test) Scheduling: [phone]  Follow Up Visit Scheduling: [phone]  My Nurse: Lulu San RN & Tracy Chavira, RN    To reach the nurse, Lulu San RN, please call ([phone]). If unable to reach Lulu, please contact Tracy Chavira RN at ([phone]). Both nurses have secure voicemail's if needing to leave a message.    **For urgent needs such as medication issues/refills, active sick symptoms or medical concerns please call the office directly and speak to the pulmonary nurse. For nonurgent messages please use [employer]. Thank you.**    "

## 2025-03-31 ENCOUNTER — APPOINTMENT (OUTPATIENT)
Dept: OTOLARYNGOLOGY | Facility: CLINIC | Age: 85
End: 2025-03-31
Payer: MEDICARE

## 2025-03-31 VITALS — BODY MASS INDEX: 30.3 KG/M2 | HEIGHT: 73 IN | WEIGHT: 228.6 LBS

## 2025-03-31 DIAGNOSIS — R05.3 CHRONIC COUGH: ICD-10-CM

## 2025-03-31 DIAGNOSIS — J38.7 PRESBYLARYNGES: ICD-10-CM

## 2025-03-31 DIAGNOSIS — J38.00 VOCAL CORD WEAKNESS: ICD-10-CM

## 2025-03-31 DIAGNOSIS — R09.82 POST-NASAL DRAINAGE: ICD-10-CM

## 2025-03-31 DIAGNOSIS — K21.9 LPRD (LARYNGOPHARYNGEAL REFLUX DISEASE): ICD-10-CM

## 2025-03-31 DIAGNOSIS — K21.9 LARYNGOPHARYNGEAL REFLUX (LPR): ICD-10-CM

## 2025-03-31 DIAGNOSIS — R49.0 DYSPHONIA: Primary | ICD-10-CM

## 2025-03-31 PROCEDURE — 31579 LARYNGOSCOPY TELESCOPIC: CPT

## 2025-03-31 PROCEDURE — 99204 OFFICE O/P NEW MOD 45 MIN: CPT

## 2025-03-31 PROCEDURE — 1123F ACP DISCUSS/DSCN MKR DOCD: CPT

## 2025-03-31 PROCEDURE — 1036F TOBACCO NON-USER: CPT

## 2025-03-31 PROCEDURE — 1159F MED LIST DOCD IN RCRD: CPT

## 2025-03-31 PROCEDURE — 1160F RVW MEDS BY RX/DR IN RCRD: CPT

## 2025-03-31 PROCEDURE — 1126F AMNT PAIN NOTED NONE PRSNT: CPT

## 2025-03-31 RX ORDER — OMEPRAZOLE 40 MG/1
40 CAPSULE, DELAYED RELEASE ORAL
Qty: 30 CAPSULE | Refills: 1 | Status: SHIPPED | OUTPATIENT
Start: 2025-03-31 | End: 2025-04-30

## 2025-03-31 RX ORDER — MAGNESIUM CARB/ALUMINUM HYDROX 105-160MG
2 TABLET,CHEWABLE ORAL NIGHTLY
Qty: 60 TABLET | Refills: 2 | Status: SHIPPED | OUTPATIENT
Start: 2025-03-31 | End: 2026-03-31

## 2025-03-31 ASSESSMENT — PAIN SCALES - GENERAL: PAINLEVEL_OUTOF10: 0-NO PAIN

## 2025-03-31 NOTE — PROGRESS NOTES
Reason For Consult  Hoarseness of voice, Nasal congestion/PND, clearing of throat, and cough     HISTORY OF PRESENT ILLNESS:  This is a request for consultation from Joe ORNELAS for Jorge Esparza, who is a 85 y.o. male presenting for an initial visit for Nasal congestion/PND, clearing of throat, and cough.  The patient reports that he has been experiencing the hoarseness for about a year. Reports vocal fatigue with increased talking, and voice will become more difficult to project with increased talking. Patient reports occasional symptoms of acid reflux. Previously tried Pantoprazole, and unable to tell if any difference in symptoms. Patient reports PND currently using Atrovent 21mcg 0.03% nasal spray. Patient reports recent epistaxis with coughing up blood tinged mucus. Patient reports episode resolved after 2-3 days. Patient reports tolerating solids, liquids, and pills when swallowing. Former smoking history.       Past Medical History  He has a past medical history of Essential (primary) hypertension (06/14/2013).  Surgical History  He has no past surgical history on file.     Social History  He reports that he quit smoking about 51 years ago. His smoking use included cigarettes, pipe, and cigars. He started smoking about 67 years ago. He has a 12.8 pack-year smoking history. He has never been exposed to tobacco smoke. He has never used smokeless tobacco. He reports current alcohol use of about 2.0 standard drinks of alcohol per week. He reports that he does not currently use drugs.    Allergies  Grass pollen and House dust    Review of Systems  All 10 systems were reviewed and negative except for above.      Physical Exam  CONSTITUTIONAL: Well developed, well nourished.    VOICE: mild to moderate variable hoarseness  RESPIRATION: Breathing comfortably, no stridor.    NEURO: Alert and oriented x3, cranial nerves II-XII intact and symmetric bilaterally.    EARS: Normal external ears, external  "auditory canals, normal hearing to conversational voice.    NOSE: External nose midline, anterior rhinoscopy is normal with limited visualization to the anterior aspect of the interior turbinates. No lesions noted.     ORAL CAVITY/OROPHARYNX/LIPS: Normal mucous membranes, normal floor of mouth/tongue/OP, no masses or lesions are noted.    SKIN: Neck skin is intact  PSYCH: Alert and oriented with appropriate mood and affect.        Last Recorded Vitals  Height 1.854 m (6' 1\"), weight 104 kg (228 lb 9.6 oz).    Procedure  PROCEDURE NOTE:  Recommended flexible laryngoscopy/stroboscopy.  Risks, benefits,  and alternatives were explained.  They wish to proceed and provide verbal consent.     PROCEDURE:  lexible laryngoscopy with stroboscopy, CPT 65431     POSTPROCEDURE DIAGNOSIS: vc weakness, glottic insufficiency    INDICATIONS: Inability to tolerate mirror exam or abnormal findings on mirror, Flexible Laryngoscopy/Stroboscopy performed to assess one of the followin. Diagnosis of symptomatic disorder involving the voice, swallow, upper aerodigestive tract, including ADARSH disorders, or  2. Preoperative evaluation of vocal cord function for individuals undergoing surgery where the RLN or vagus nerves are at risk of injury, or  3. Further evaluation of abnormalities of the upper aerodigestive tract discovered by another modality, such as CT, MRI, bronchoscopy or EGD    Description of Procedure:    After adequate afrin and lidocaine spray, I advanced the endoscope.  Visualization of the nasopharynx, vallecula, posterior pharyngeal walls, pyriform, epiglottis and post cricoid areas was unremarkable.  The following laryngeal findings were noted:    vocal cord movement was bilateral vc weakness R>L. 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  closure was slight incomplete gap  Mucosal wave was decreased  Compression was increased AP and FVC (L>R)  edema was none  interarytenoid edema present  lesions were none  the subglottis was widely " patent  Pharyngeal wall squeeze was normal    Procedure well tolerated.       ASSESSMENT AND PLAN:   This is an initial visit for dysphonia, PND, cough, and clearing of the throat with clinical findings notable for bilateral vc weakness R>L. Increased compression AP and FVC (L>R). No masses or lesions. Left septum small amount of epistaxis, will have patient use saline nasal spray daily in the morning. Discussed option of voice therapy and vocal cord injection. Patient would like to hold off at this time. IA edema present- Discussed use of Omeprazole 40 mg in morning for 1 month with use of gaviscon before bed. Discussed not taking directly with Xarelto. Patient will use Xarelto around 5pm and gaviscon before bed roughly around 1030 pm. Patient agreeable to plan. Can message in 1-1.5 for update on symptoms. All questions answered.   Secondary issues identified and managed on this patient visit include:    Diagnoses are exacerbated by:    We discussed the treatment options to include, medical and surgical options.  We have decided to proceed as follows:   Omeprazole daily in morning  Gaviscon right before bed for LPR  Discussed use of saline spray in morning.   [place] water gargles for mucus management  4.    Can follow up via phone in 1-2 months regarding symptoms.

## 2025-03-31 NOTE — LETTER
April 1, 2025     [facility]  [address]  [facility] [address]    Patient: Jorge Esparza   YOB: 1940   Date of Visit: 3/31/2025       Dear MARTIN Parra-CNP:    Thank you for referring Jorge Esparza to me for evaluation. Below are my notes for this consultation.  If you have questions, please do not hesitate to call me. I look forward to following your patient along with you.       Sincerely,     Jerson Dozier, MARTIN-CNP      CC: No Recipients  ______________________________________________________________________________________    Reason For Consult  Hoarseness of voice, Nasal congestion/PND, clearing of throat, and cough     HISTORY OF PRESENT ILLNESS:  This is a request for consultation from Joe SALAZAR-CNP for Jorge Esparza, who is a 85 y.o. male presenting for an initial visit for Nasal congestion/PND, clearing of throat, and cough.  The patient reports that he has been experiencing the hoarseness for about a year. Reports vocal fatigue with increased talking, and voice will become more difficult to project with increased talking. Patient reports occasional symptoms of acid reflux. Previously tried Pantoprazole, and unable to tell if any difference in symptoms. Patient reports PND currently using Atrovent 21mcg 0.03% nasal spray. Patient reports recent epistaxis with coughing up blood tinged mucus. Patient reports episode resolved after 2-3 days. Patient reports tolerating solids, liquids, and pills when swallowing. Former smoking history.       Past Medical History  He has a past medical history of Essential (primary) hypertension (06/14/2013).  Surgical History  He has no past surgical history on file.     Social History  He reports that he quit smoking about 51 years ago. His smoking use included cigarettes, pipe, and cigars. He started smoking about 67 years ago. He has a 12.8 pack-year smoking history. He has never been exposed to tobacco  "smoke. He has never used smokeless tobacco. He reports current alcohol use of about 2.0 standard drinks of alcohol per week. He reports that he does not currently use drugs.    Allergies  Grass pollen and House dust    Review of Systems  All 10 systems were reviewed and negative except for above.      Physical Exam  CONSTITUTIONAL: Well developed, well nourished.    VOICE: mild to moderate variable hoarseness  RESPIRATION: Breathing comfortably, no stridor.    NEURO: Alert and oriented x3, cranial nerves II-XII intact and symmetric bilaterally.    EARS: Normal external ears, external auditory canals, normal hearing to conversational voice.    NOSE: External nose midline, anterior rhinoscopy is normal with limited visualization to the anterior aspect of the interior turbinates. No lesions noted.     ORAL CAVITY/OROPHARYNX/LIPS: Normal mucous membranes, normal floor of mouth/tongue/OP, no masses or lesions are noted.    SKIN: Neck skin is intact  PSYCH: Alert and oriented with appropriate mood and affect.        Last Recorded Vitals  Height 1.854 m (6' 1\"), weight 104 kg (228 lb 9.6 oz).    Procedure  PROCEDURE NOTE:  Recommended flexible laryngoscopy/stroboscopy.  Risks, benefits,  and alternatives were explained.  They wish to proceed and provide verbal consent.     PROCEDURE:  lexible laryngoscopy with stroboscopy, CPT 95720     POSTPROCEDURE DIAGNOSIS: vc weakness, glottic insufficiency    INDICATIONS: Inability to tolerate mirror exam or abnormal findings on mirror, Flexible Laryngoscopy/Stroboscopy performed to assess one of the followin. Diagnosis of symptomatic disorder involving the voice, swallow, upper aerodigestive tract, including ADARSH disorders, or  2. Preoperative evaluation of vocal cord function for individuals undergoing surgery where the RLN or vagus nerves are at risk of injury, or  3. Further evaluation of abnormalities of the upper aerodigestive tract discovered by another modality, such as " CT, MRI, bronchoscopy or EGD    Description of Procedure:    After adequate afrin and lidocaine spray, I advanced the endoscope.  Visualization of the nasopharynx, vallecula, posterior pharyngeal walls, pyriform, epiglottis and post cricoid areas was unremarkable.  The following laryngeal findings were noted:    vocal cord movement was bilateral vc weakness R>L. 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       ASSESSMENT AND PLAN:   This is an initial visit for dysphonia, PND, cough, and clearing of the throat with clinical findings notable for bilateral vc weakness R>L. Increased compression AP and FVC (L>R). No masses or lesions. Left septum small amount of epistaxis, will have patient use saline nasal spray daily in the morning. Discussed option of voice therapy and vocal cord injection. Patient would like to hold off at this time. IA edema present- Discussed use of Omeprazole 40 mg in morning for 1 month with use of gaviscon before bed. Discussed not taking directly with Xarelto. Patient will use Xarelto around 5pm and gaviscon before bed roughly around 1030 pm. Patient agreeable to plan. Can message in 1-1.5 for update on symptoms. All questions answered.   Secondary issues identified and managed on this patient visit include:    Diagnoses are exacerbated by:    We discussed the treatment options to include, medical and surgical options.  We have decided to proceed as follows:   Omeprazole daily in morning  Gaviscon right before bed for LPR  Discussed use of saline spray in morning.   Dierks water gargles for mucus management  4.    Can follow up via phone in 1-2 months regarding symptoms.

## 2025-04-29 ENCOUNTER — APPOINTMENT (OUTPATIENT)
Dept: OTOLARYNGOLOGY | Facility: CLINIC | Age: 85
End: 2025-04-29
Payer: MEDICARE

## 2025-05-29 ENCOUNTER — APPOINTMENT (OUTPATIENT)
Dept: OTOLARYNGOLOGY | Facility: CLINIC | Age: 85
End: 2025-05-29
Payer: MEDICARE

## 2025-05-29 DIAGNOSIS — J34.89 NASAL AND SINUS DISCHARGE: ICD-10-CM

## 2025-05-29 DIAGNOSIS — R49.0 HOARSENESS OF VOICE: Primary | ICD-10-CM

## 2025-05-29 DIAGNOSIS — J38.7 PRESBYLARYNGES: ICD-10-CM

## 2025-05-29 PROCEDURE — 1159F MED LIST DOCD IN RCRD: CPT

## 2025-05-29 PROCEDURE — 1036F TOBACCO NON-USER: CPT

## 2025-05-29 PROCEDURE — 99213 OFFICE O/P EST LOW 20 MIN: CPT

## 2025-05-29 PROCEDURE — 1160F RVW MEDS BY RX/DR IN RCRD: CPT

## 2025-05-29 RX ORDER — AZELASTINE 1 MG/ML
2 SPRAY, METERED NASAL 2 TIMES DAILY
Qty: 30 ML | Refills: 3 | Status: SHIPPED | OUTPATIENT
Start: 2025-05-29 | End: 2025-06-28

## 2025-05-29 ASSESSMENT — PATIENT HEALTH QUESTIONNAIRE - PHQ9
2. FEELING DOWN, DEPRESSED OR HOPELESS: NOT AT ALL
1. LITTLE INTEREST OR PLEASURE IN DOING THINGS: NOT AT ALL
SUM OF ALL RESPONSES TO PHQ9 QUESTIONS 1 AND 2: 0

## 2025-05-29 NOTE — PROGRESS NOTES
Chief Complaint  hoarseness       Pertinent History:  Seen last 3/31/25:  Jorge Esparza, who is a 85 y.o. male presenting for an initial visit for Nasal congestion/PND, clearing of throat, and cough.  The patient reports that he has been experiencing the hoarseness for about a year. Reports vocal fatigue with increased talking, and voice will become more difficult to project with increased talking. Patient reports occasional symptoms of acid reflux. Previously tried Pantoprazole, and unable to tell if any difference in symptoms. Patient reports PND currently using Atrovent 21mcg 0.03% nasal spray. Patient reports recent epistaxis with coughing up blood tinged mucus. Patient reports episode resolved after 2-3 days. Patient reports tolerating solids, liquids, and pills when swallowing. Former smoking history.   Interval History  Since last visit patient reports that his cough has been reduced. Patient reports still having hoarseness intermittently that will worsen with increased talking. Patient also reports vocal fatigue with increased talking. Patient completed one month of Omeprazole.     Exam:  VOICE: mild hoarseness  RESPIRATION: Breathing comfortably, no stridor.    ORAL CAVITY/OROPHARYNX/LIPS: Normal mucous membranes, normal floor of mouth/tongue/OP, no masses or lesions are noted.    SKIN: Neck skin is without scar or injury.    PSYCH: Alert and oriented with appropriate mood and affect.       Assessment and Plan:  This is a follow up visit for hoarseness of voice. Last visit patient attempted reflux control to help in voice quality. Discussed the option of voice therapy due to vc weakness and MTD that was seen on previous scope. Discussed option of vc injection, patient would like to work with voice therapy at this time.  Will place referral for voice therapy and patient can follow up post therapy as needed. Patient agreeable to plan.   The patient's questions were answered.

## 2025-06-19 DIAGNOSIS — J45.40 MODERATE PERSISTENT ASTHMA WITHOUT COMPLICATION (HHS-HCC): ICD-10-CM

## 2025-06-19 RX ORDER — BUDESONIDE AND FORMOTEROL FUMARATE DIHYDRATE 160; 4.5 UG/1; UG/1
AEROSOL RESPIRATORY (INHALATION)
Qty: 10.2 G | Refills: 5 | Status: SHIPPED | OUTPATIENT
Start: 2025-06-19

## 2025-07-02 ENCOUNTER — EVALUATION (OUTPATIENT)
Dept: SPEECH THERAPY | Facility: CLINIC | Age: 85
End: 2025-07-02
Payer: MEDICARE

## 2025-07-02 DIAGNOSIS — R49.0 DYSPHONIA: Primary | ICD-10-CM

## 2025-07-02 DIAGNOSIS — J38.7 PRESBYLARYNGES: ICD-10-CM

## 2025-07-02 PROCEDURE — 2700000081 HC SLP EMST 150 EXP MUSCLE STRGTH TRAINER: Performed by: SPEECH-LANGUAGE PATHOLOGIST

## 2025-07-02 PROCEDURE — 92507 TX SP LANG VOICE COMM INDIV: CPT | Mod: GN | Performed by: SPEECH-LANGUAGE PATHOLOGIST

## 2025-07-02 PROCEDURE — 92524 BEHAVRAL QUALIT ANALYS VOICE: CPT | Mod: GN | Performed by: SPEECH-LANGUAGE PATHOLOGIST

## 2025-07-02 ASSESSMENT — PAIN SCALES - GENERAL: PAINLEVEL_OUTOF10: 0 - NO PAIN

## 2025-07-02 ASSESSMENT — PAIN - FUNCTIONAL ASSESSMENT: PAIN_FUNCTIONAL_ASSESSMENT: 0-10

## 2025-07-02 NOTE — PROGRESS NOTES
Speech-Language Pathology    Voice Evaluation    Patient Name: Jorge Esparza  MRN: [id number]  Today's Date: 7/2/2025     Time Calculation  Start Time: 1300  Stop Time: 1345  Time Calculation (min): 45 min      Current Problem:  Problem List[1]    Voice Assessment:   Patient is an 84 yo male presenting with chronic dysphonia referred to me by Jerson Dozier CNP.     His dysphonia is variable with increased effort and poor projection. Recent laryngoscopy performed by Jerson Dozier CNP revealed bilateral VF weakness with secondary MTD with recommendations for vocal augmentation versus voice therapy.     Initiated RMST with use of [id number]. The device was calibrated to patient's MEP identified as 50 cm H2O this date. Patient able to complete 5 sets of 5 breaths through the device with accurate performance observed. Recommend they complete full set of RMST 5 days/week and to increase the resistance of their device by 1/4-1/2 turn each week. All questions answered.     Plan to engage in vocal strengthening at our next session with consideration of vocal fold injection should we fail to reach our goals with therapy alone.     Overall, patient would benefit from skilled ST in the area of voice in order to increase vocal wellness, healthy voicing to foster increased participation and comfort levels at home, work and in the community environment.    Skilled ST services are medically necessary and ordered by a physician in order to provide vocal hygiene program, decrease phono trauma and promote healthy voicing to encourage active, safe and sustainable vocal participation in the completion of patient's ADLs.        Voice Plan of Care:  Frequency: x1/eowk  Duration: x12 weeks  Number of Visits: 4-8  Recommendations for therapeutic interventions: Speech/Voice exercises, Vocal hygiene program, Oral resonance techniques, Habituation training, Vocal strengthening techniques  Prognosis: Good  Factors affecting prognosis:  None  Discussed Plan of Care: Patient, Physician, Discussed risks/benefits with patient/caregiver, Patient/caregiver agreeable with Plan of Care    Subjective   Current Problem:     Patient is an 84 yo male presenting with c/o hoarseness referred to me by Jerson Dozier CNP.     Pmhx sig asthma, GERD, chronic cough    He reports hoarseness >1 year. This fluctuates in severity without strong identifying cause. Can worsen with use with patient reporting increased effort with speaking. He has been seeing my colleague, Jerson Dozier CNP for cough and hoarseness and was treated with reflux medication. This has significantly improved his cough however his voice remains dysphonic.     SLP Visit Info:  SLP Received On: 07/02/25     General Visit Information:  Patient Class: Outpatient  Living Environment: Home  Arrival: Independent  Ordering Physician: Jerson Dozier CNP  Reason for Referral: weak voicing, fluctuating vocal quality    Objective     Pain Assessment:  Pain Assessment  Pain Assessment: 0-10  0-10 (Numeric) Pain Score: 0 - No pain    GRBAS:  stGstrstastdstest:st st1st Roughness: 1  Breathiness: 2  Asthenia: 2  Strain: 2    Voice Use Inventory:  Voice misuse/abuse: None  Exposure to Noise: No  Exposure to respiratory irritants: Yes  Consistent use of singing voice: No  Occupation relies on speaking voice: No    Patient Self Assessment:  Daily water intake: Yes  Daily caffeine intake: Yes  Alcohol intake: Yes  Smoking history: Yes  Reflux history: Yes    Voice Objective:  Motor Speech Production: WFL  Pitch: Inadequate range  Voice Quality: Breathy, Hoarse, Weak  Fluency: WFL  Prosody: WFL  Resonance: WFL  Loudness: Inadequate range      Voice Analysis:   vocal cord movement was bilateral vc weakness R>L. 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  closure was slight incomplete gap  Mucosal wave was decreased  Compression was increased AP and FVC (L>R)  edema was none  interarytenoid edema present  lesions were none  the subglottis was widely  patent  Pharyngeal wall squeeze was normal  Byhernandez Dozier CNP 3/31/25    Voice Treatment:  Individual(s) Educated: Patient  Verbal Education: Risks/benefits of therapy, Results of testing, Communication strategies  Response to Education: Verbalized understanding, Demonstrated understanding, Needs review  Patient/Caregiver Verbalized Understanding and Agreement: Yes      Time In: 1300  Time Out: 1345         Patient will increase vocal wellness and decrease phonotrauma in adherence with clinician prescribed vocal hygiene and wellness program per patient report.   Patient will increase ability to produce voice without tension within 5 minute conversational task x80% accuracy as judged by clinician observation and/or patient report.   Patient will demonstrate independent use of voice/breathing techniques x80% accuracy.             [1]   Patient Active Problem List  Diagnosis    Well adult exam    Chronic cough    Reactive airway disease without complication (HHS-HCC)    Post-nasal drainage    Atrial fibrillation (Multi)    Moderate persistent asthma without complication (HHS-HCC)    Severe persistent asthma without complication (Multi)    Dysphonia    Presbylarynges

## 2025-07-22 ENCOUNTER — TREATMENT (OUTPATIENT)
Dept: SPEECH THERAPY | Facility: CLINIC | Age: 85
End: 2025-07-22
Payer: MEDICARE

## 2025-07-22 DIAGNOSIS — J38.7 PRESBYLARYNGES: ICD-10-CM

## 2025-07-22 DIAGNOSIS — R49.0 DYSPHONIA: Primary | ICD-10-CM

## 2025-07-22 PROCEDURE — 92507 TX SP LANG VOICE COMM INDIV: CPT | Mod: GN | Performed by: SPEECH-LANGUAGE PATHOLOGIST

## 2025-07-22 ASSESSMENT — PAIN - FUNCTIONAL ASSESSMENT: PAIN_FUNCTIONAL_ASSESSMENT: 0-10

## 2025-07-22 ASSESSMENT — PAIN SCALES - GENERAL: PAINLEVEL_OUTOF10: 0 - NO PAIN

## 2025-07-22 NOTE — PROGRESS NOTES
"Speech-Language Pathology    SLP Adult Outpatient Speech-Language Pathology Treatment     Patient Name: Jorge Esparza  MRN: [id number]  Today's Date: 7/22/2025     Time Calculation  Start Time: 0845  Stop Time: 0920  Time Calculation (min): 35 min      Current Problem:   1. Dysphonia        2. Presbylarynges              SLP Assessment:  SLP TX Intervention Outcome: Making Progress Towards Goals  Prognosis: Excellent  Treatment Tolerance: Patient tolerated treatment well  Barriers: None  Education Provided: Yes    Patient presents for continued ST intervention.     He reports continued fluctuations with his voice. Rates his overall voicing as \"okay\".    He endorses adherence to RMST. Will often use his device while waiting for his computer to power on. Continued RMST with use of [id number]. The patient's MEP has increased from 50 to 65 cm H2O. Patient able to complete 5 sets of 5 breaths through the device with accurate performance observed. Recommend they complete full set of RMST 5 days/week and to increase the resistance of their device by 1/4-1/2 turn each week. All questions answered.      Initiated vocal strengthening with use of PhoRTE voice protocol. Patient able to perform steps 1-3 with min cues. Benefits from cues to stop voicing when using residual air. MPT measured at 9 seconds this date. Patient able to perform vocal glides without pitch break present. Patient to complete PhoRTE protocol x2 per day.     Plan to advance to phrase generation at next session.     Overall, patient continues to benefit from skilled ST intervention in the area of voice for increased comfort and participation levels at home, work and in the community environment.      Skilled ST services are medically necessary and ordered by a physician in order to provide vocal hygiene program, decrease phono trauma and promote healthy voicing to encourage active, safe and sustainable vocal participation in the completion of patient's ADLs. "      Plan:  Inpatient/Swing Bed or Outpatient: Outpatient  Treatment/Interventions: Voice  SLP TX Plan: Continue Plan of Care  SLP Plan: Skilled SLP  SLP Frequency: Every other week  Duration: 12 weeks  SLP Discharge Recommendations: Home independent  Discussed POC: Patient  Discussed Risks/Benefits: Yes, Patient  Patient/Caregiver Agreeable: Yes      Subjective   Current Problem:  Dysphonia, presbylarynges    SLP Visit Info:  SLP Received On: 07/22/25    General Visit Information:  Reason for Referral: weak voicing, fluctuating vocal quality  Referred By: Jerson Dozier CNP  Arrival: Independent    Pain Assessment:  Pain Assessment  Pain Assessment: 0-10  0-10 (Numeric) Pain Score: 0 - No pain      Objective       Voice:  Voice Interventions: Vocal Hygiene Program, Vocal Strengthening Techniques, Respiratory Retraining  Voice Comments: EMST phorte 1-3      Outpatient Education:  Adult Outpatient Education  Individual(s) Educated: Patient  Risk and Benefits Discussed with Patient/Caregiver/Other: yes  Patient/Caregiver Demonstrated Understanding: yes  Plan of Care Discussed and Agreed Upon: yes  Patient Response to Education: Patient/Caregiver Verbalized Understanding of Information, Patient/Caregiver Performed Return Demonstration of Exercises/Activities, Patient/Caregiver Asked Appropriate Questions       Patient will increase vocal wellness and decrease phonotrauma in adherence with clinician prescribed vocal hygiene and wellness program per patient report.   Patient will increase ability to produce voice without tension within 5 minute conversational task x80% accuracy as judged by clinician observation and/or patient report.   Patient will demonstrate independent use of voice/breathing techniques x80% accuracy.  Patient will increase the strength of laryngeal/respiratory musculature.

## 2025-07-25 DIAGNOSIS — R31.9 HEMATURIA, UNSPECIFIED TYPE: ICD-10-CM

## 2025-07-25 DIAGNOSIS — R73.01 ELEVATED FASTING GLUCOSE: ICD-10-CM

## 2025-07-25 DIAGNOSIS — E78.00 ELEVATED LDL CHOLESTEROL LEVEL: ICD-10-CM

## 2025-07-25 DIAGNOSIS — R94.5 ABNORMAL LIVER FUNCTION: ICD-10-CM

## 2025-07-25 DIAGNOSIS — R74.8 ELEVATED CREATINE KINASE: ICD-10-CM

## 2025-07-25 DIAGNOSIS — Z13.29 SCREENING FOR THYROID DISORDER: ICD-10-CM

## 2025-07-25 DIAGNOSIS — E55.9 VITAMIN D DEFICIENCY: ICD-10-CM

## 2025-07-25 DIAGNOSIS — Z12.5 SCREENING FOR PROSTATE CANCER: ICD-10-CM

## 2025-07-25 DIAGNOSIS — R79.9 ELEVATED BLOOD UREA NITROGEN: ICD-10-CM

## 2025-07-25 DIAGNOSIS — R79.9 ABNORMAL BLOOD CHEMISTRY: ICD-10-CM

## 2025-08-06 ENCOUNTER — TREATMENT (OUTPATIENT)
Dept: SPEECH THERAPY | Facility: CLINIC | Age: 85
End: 2025-08-06
Payer: MEDICARE

## 2025-08-06 DIAGNOSIS — J38.7 PRESBYLARYNGES: ICD-10-CM

## 2025-08-06 DIAGNOSIS — R49.0 DYSPHONIA: Primary | ICD-10-CM

## 2025-08-06 PROCEDURE — 92507 TX SP LANG VOICE COMM INDIV: CPT | Mod: GN | Performed by: SPEECH-LANGUAGE PATHOLOGIST

## 2025-08-06 ASSESSMENT — PAIN - FUNCTIONAL ASSESSMENT: PAIN_FUNCTIONAL_ASSESSMENT: 0-10

## 2025-08-06 ASSESSMENT — PAIN SCALES - GENERAL: PAINLEVEL_OUTOF10: 0 - NO PAIN

## 2025-08-06 NOTE — PROGRESS NOTES
Speech-Language Pathology    SLP Adult Outpatient Speech-Language Pathology Treatment     Patient Name: Jorge Esparza  MRN: [id number]  Today's Date: 8/6/2025     Time Calculation  Start Time: 1300  Stop Time: 1345  Time Calculation (min): 45 min      Current Problem:   1. Dysphonia  Follow Up In Speech Therapy      2. Presbylarynges  Follow Up In Speech Therapy            SLP Assessment:  SLP TX Intervention Outcome: Making Progress Towards Goals  Prognosis: Excellent  Treatment Tolerance: Patient tolerated treatment well  Barriers: None  Education Provided: Yes    Patient presents for continued ST intervention.      He reports continued fluctuations with his voice. States voice had been doing well for several weeks but recently worsened following afternoon mowing his lawn.     He endorses adherence to RMST. Will often use his device while waiting for his computer to power on. Continued RMST with use of [id number]. The patient's MEP has increased from 65- 75 cm H2O. Patient able to complete 5 sets of 5 breaths through the device with accurate performance observed. Recommend they complete full set of RMST 5 days/week and to increase the resistance of their device by 1/4-1/2 turn each week. All questions answered.       Continued vocal strengthening with use of PhoRTE voice protocol. Patient able to perform steps 1-3 with min cues. MPT measured has increased from 9-12 seconds this date. Patient able to perform vocal glides without pitch break present. Patient to complete PhoRTE protocol x2 per day.      Added phrase targeting this date with good accuracy.    Reviewed inhaler hygiene with recommendations to gargle after inhaler use.     Patient will complete home programming for 1 month and return for care planning. May consider vocal fold injection should he not achieve healthy baseline.     Overall, patient continues to benefit from skilled ST intervention in the area of voice for increased comfort and participation  levels at home, work and in the community environment.       Skilled ST services are medically necessary and ordered by a physician in order to provide vocal hygiene program, decrease phono trauma and promote healthy voicing to encourage active, safe and sustainable vocal participation in the completion of patient's ADLs.    Plan:  Inpatient/Swing Bed or Outpatient: Outpatient  Treatment/Interventions: Voice  SLP TX Plan: Continue Plan of Care  SLP Plan: Skilled SLP  SLP Frequency: Every other week  Duration: 12 weeks  SLP Discharge Recommendations: Home independent  Discussed POC: Patient  Discussed Risks/Benefits: Yes, Patient  Patient/Caregiver Agreeable: Yes      Subjective   Current Problem:  Dysphonia     SLP Visit Info:  SLP Received On: 08/06/25    General Visit Information:  Reason for Referral: weak voicing,fluctuating vocalquality  Referred By: Jerson Dozier CNP  Arrival: Independent    Pain Assessment:  Pain Assessment  Pain Assessment: 0-10  0-10 (Numeric) Pain Score: 0 - No pain      Objective       Voice:  Voice Interventions: Vocal Hygiene Program, Oral Resonance Techniques, Habituation Training, Vocal Strengthening Techniques, Respiratory Retraining  Voice Comments: ALL PHORTE,EMST      Outpatient Education:  Adult Outpatient Education  Individual(s) Educated: Patient  Risk and Benefits Discussed with Patient/Caregiver/Other: yes  Patient/Caregiver Demonstrated Understanding: yes  Plan of Care Discussed and Agreed Upon: yes  Patient Response to Education: Patient/Caregiver Verbalized Understanding of Information, Patient/Caregiver Performed Return Demonstration of Exercises/Activities, Patient/Caregiver Asked Appropriate Questions       Patient will increase vocal wellness and decrease phonotrauma in adherence with clinician prescribed vocal hygiene and wellness program per patient report.   Patient will increase ability to produce voice without tension within 5 minute conversational task x80%  accuracy as judged by clinician observation and/or patient report.   Patient will demonstrate independent use of voice/breathing techniques x80% accuracy.  Patient will increase the strength of laryngeal/swallowing musculature.

## 2025-09-04 LAB
25(OH)D3+25(OH)D2 SERPL-MCNC: 37 NG/ML (ref 30–100)
ALT SERPL-CCNC: 16 U/L (ref 9–46)
ANION GAP SERPL CALCULATED.4IONS-SCNC: 9 MMOL/L (CALC) (ref 7–17)
APPEARANCE UR: CLEAR
BASOPHILS # BLD AUTO: 28 CELLS/UL (ref 0–200)
BASOPHILS NFR BLD AUTO: 0.6 %
BILIRUB UR QL STRIP: NEGATIVE
BUN SERPL-MCNC: 26 MG/DL (ref 7–25)
BUN/CREAT SERPL: 18 (CALC) (ref 6–22)
CALCIUM SERPL-MCNC: 8.7 MG/DL (ref 8.6–10.3)
CHLORIDE SERPL-SCNC: 107 MMOL/L (ref 98–110)
CHOLEST SERPL-MCNC: 176 MG/DL
CHOLEST/HDLC SERPL: 3 (CALC)
CO2 SERPL-SCNC: 24 MMOL/L (ref 20–32)
COLOR UR: YELLOW
CREAT SERPL-MCNC: 1.42 MG/DL (ref 0.7–1.22)
EGFRCR SERPLBLD CKD-EPI 2021: 48 ML/MIN/1.73M2
EOSINOPHIL # BLD AUTO: 240 CELLS/UL (ref 15–500)
EOSINOPHIL NFR BLD AUTO: 5.1 %
ERYTHROCYTE [DISTWIDTH] IN BLOOD BY AUTOMATED COUNT: 12.2 % (ref 11–15)
EST. AVERAGE GLUCOSE BLD GHB EST-MCNC: 117 MG/DL
EST. AVERAGE GLUCOSE BLD GHB EST-SCNC: 6.5 MMOL/L
GLUCOSE SERPL-MCNC: 97 MG/DL (ref 65–99)
GLUCOSE UR QL STRIP: NEGATIVE
HBA1C MFR BLD: 5.7 %
HCT VFR BLD AUTO: 47.5 % (ref 38.5–50)
HDLC SERPL-MCNC: 58 MG/DL
HGB BLD-MCNC: 15.9 G/DL (ref 13.2–17.1)
HGB UR QL STRIP: NEGATIVE
KETONES UR QL STRIP: NEGATIVE
LDLC SERPL CALC-MCNC: 104 MG/DL (CALC)
LEUKOCYTE ESTERASE UR QL STRIP: NEGATIVE
LYMPHOCYTES # BLD AUTO: 780 CELLS/UL (ref 850–3900)
LYMPHOCYTES NFR BLD AUTO: 16.6 %
MCH RBC QN AUTO: 32.3 PG (ref 27–33)
MCHC RBC AUTO-ENTMCNC: 33.5 G/DL (ref 32–36)
MCV RBC AUTO: 96.3 FL (ref 80–100)
MONOCYTES # BLD AUTO: 602 CELLS/UL (ref 200–950)
MONOCYTES NFR BLD AUTO: 12.8 %
NEUTROPHILS # BLD AUTO: 3050 CELLS/UL (ref 1500–7800)
NEUTROPHILS NFR BLD AUTO: 64.9 %
NITRITE UR QL STRIP: NEGATIVE
NONHDLC SERPL-MCNC: 118 MG/DL (CALC)
PH UR STRIP: 6.5 [PH] (ref 5–8)
PLATELET # BLD AUTO: 194 THOUSAND/UL (ref 140–400)
PMV BLD REES-ECKER: 8.9 FL (ref 7.5–12.5)
POTASSIUM SERPL-SCNC: 4.6 MMOL/L (ref 3.5–5.3)
PROT UR QL STRIP: NEGATIVE
PSA SERPL-MCNC: 0.87 NG/ML
RBC # BLD AUTO: 4.93 MILLION/UL (ref 4.2–5.8)
SODIUM SERPL-SCNC: 140 MMOL/L (ref 135–146)
SP GR UR STRIP: 1.02 (ref 1–1.03)
TRIGL SERPL-MCNC: 49 MG/DL
TSH SERPL-ACNC: 2.34 MIU/L (ref 0.4–4.5)
VIT B12 SERPL-MCNC: 322 PG/ML (ref 200–1100)
WBC # BLD AUTO: 4.7 THOUSAND/UL (ref 3.8–10.8)

## 2025-09-19 ENCOUNTER — APPOINTMENT (OUTPATIENT)
Dept: PRIMARY CARE | Facility: CLINIC | Age: 85
End: 2025-09-19
Payer: MEDICARE